# Patient Record
Sex: MALE | Race: WHITE | NOT HISPANIC OR LATINO | Employment: OTHER | URBAN - METROPOLITAN AREA
[De-identification: names, ages, dates, MRNs, and addresses within clinical notes are randomized per-mention and may not be internally consistent; named-entity substitution may affect disease eponyms.]

---

## 2023-08-17 PROBLEM — R04.0 EPISTAXIS: Status: ACTIVE | Noted: 2023-08-17

## 2023-08-17 PROBLEM — M19.90 OSTEOARTHRITIS: Status: ACTIVE | Noted: 2023-08-17

## 2023-08-17 PROBLEM — I10 HYPERTENSION: Status: ACTIVE | Noted: 2023-08-17

## 2023-08-17 PROBLEM — R07.9 INTERMITTENT CHEST PAIN: Status: ACTIVE | Noted: 2023-08-17

## 2023-08-17 PROBLEM — N40.0 BENIGN PROSTATIC HYPERPLASIA WITHOUT URINARY OBSTRUCTION: Status: ACTIVE | Noted: 2023-08-17

## 2023-08-17 PROBLEM — Z95.1 PRESENCE OF AORTOCORONARY BYPASS GRAFT: Status: ACTIVE | Noted: 2023-08-17

## 2023-08-17 PROBLEM — K40.90 INGUINAL HERNIA, LEFT: Status: ACTIVE | Noted: 2023-08-17

## 2023-08-17 PROBLEM — I25.10 ATHEROSCLEROTIC HEART DISEASE OF NATIVE CORONARY ARTERY WITHOUT ANGINA PECTORIS: Status: ACTIVE | Noted: 2023-08-17

## 2023-08-17 PROBLEM — E78.00 HYPERCHOLESTEROLEMIA: Status: ACTIVE | Noted: 2023-08-17

## 2023-08-17 PROBLEM — H91.90 HEARING LOSS: Status: ACTIVE | Noted: 2023-08-17

## 2023-08-17 PROBLEM — M25.511 RIGHT SHOULDER PAIN: Status: ACTIVE | Noted: 2023-08-17

## 2023-08-17 PROBLEM — S49.91XA RIGHT SHOULDER INJURY: Status: ACTIVE | Noted: 2023-08-17

## 2023-08-17 PROBLEM — Z98.62 HISTORY OF ANGIOPLASTY: Status: ACTIVE | Noted: 2023-08-17

## 2023-08-17 PROBLEM — M54.12 CERVICAL RADICULOPATHY: Status: ACTIVE | Noted: 2023-08-17

## 2023-08-17 PROBLEM — J34.2 DEVIATED NASAL SEPTUM: Status: ACTIVE | Noted: 2023-08-17

## 2023-08-17 RX ORDER — FOLIC ACID 0.4 MG
400 TABLET ORAL DAILY
COMMUNITY

## 2023-08-17 RX ORDER — MIRABEGRON 25 MG/1
TABLET, FILM COATED, EXTENDED RELEASE ORAL
COMMUNITY
Start: 2019-12-01 | End: 2024-04-12 | Stop reason: ALTCHOICE

## 2023-08-17 RX ORDER — ASPIRIN 81 MG/1
81 TABLET ORAL DAILY
COMMUNITY

## 2023-08-17 RX ORDER — NITROGLYCERIN 0.4 MG/1
0.4 TABLET SUBLINGUAL AS NEEDED
COMMUNITY

## 2023-08-17 RX ORDER — NAPROXEN SODIUM 220 MG/1
81 TABLET, FILM COATED ORAL DAILY
COMMUNITY
End: 2024-04-12 | Stop reason: ALTCHOICE

## 2023-08-17 RX ORDER — ROSUVASTATIN CALCIUM 40 MG/1
40 TABLET, COATED ORAL DAILY
COMMUNITY
Start: 2012-09-05

## 2023-08-17 RX ORDER — LYSINE HCL 500 MG
TABLET ORAL
COMMUNITY
End: 2024-04-12 | Stop reason: ALTCHOICE

## 2023-08-17 RX ORDER — FOLIC ACID 20 MG
CAPSULE ORAL
COMMUNITY
End: 2024-04-12 | Stop reason: ALTCHOICE

## 2023-08-17 RX ORDER — METOPROLOL SUCCINATE 25 MG/1
0.5 TABLET, EXTENDED RELEASE ORAL 2 TIMES DAILY
COMMUNITY
Start: 2012-02-22 | End: 2024-01-31 | Stop reason: SDUPTHER

## 2023-08-17 RX ORDER — CIPROFLOXACIN 500 MG/1
TABLET ORAL
COMMUNITY
Start: 2022-10-24 | End: 2024-04-12 | Stop reason: ALTCHOICE

## 2023-08-17 RX ORDER — MECLIZINE HYDROCHLORIDE 25 MG/1
75 TABLET ORAL DAILY PRN
COMMUNITY

## 2023-08-17 RX ORDER — AMLODIPINE BESYLATE 5 MG/1
5 TABLET ORAL DAILY
COMMUNITY
Start: 2023-04-19 | End: 2023-11-01 | Stop reason: SINTOL

## 2023-08-17 RX ORDER — QUINAPRIL 5 1/1
0.5 TABLET ORAL DAILY
COMMUNITY
Start: 2012-02-22 | End: 2024-04-12 | Stop reason: ALTCHOICE

## 2023-08-17 RX ORDER — TAMSULOSIN HYDROCHLORIDE 0.4 MG/1
0.4 CAPSULE ORAL DAILY
COMMUNITY
Start: 2012-12-21 | End: 2024-04-12 | Stop reason: ALTCHOICE

## 2023-11-01 ENCOUNTER — OFFICE VISIT (OUTPATIENT)
Dept: CARDIOLOGY | Facility: CLINIC | Age: 73
End: 2023-11-01
Payer: MEDICARE

## 2023-11-01 VITALS
WEIGHT: 140 LBS | OXYGEN SATURATION: 98 % | DIASTOLIC BLOOD PRESSURE: 70 MMHG | HEART RATE: 60 BPM | BODY MASS INDEX: 24.03 KG/M2 | SYSTOLIC BLOOD PRESSURE: 130 MMHG

## 2023-11-01 DIAGNOSIS — I25.10 ATHEROSCLEROSIS OF NATIVE CORONARY ARTERY OF NATIVE HEART WITHOUT ANGINA PECTORIS: ICD-10-CM

## 2023-11-01 DIAGNOSIS — I10 PRIMARY HYPERTENSION: Primary | ICD-10-CM

## 2023-11-01 PROCEDURE — 3075F SYST BP GE 130 - 139MM HG: CPT | Performed by: INTERNAL MEDICINE

## 2023-11-01 PROCEDURE — 1159F MED LIST DOCD IN RCRD: CPT | Performed by: INTERNAL MEDICINE

## 2023-11-01 PROCEDURE — 99213 OFFICE O/P EST LOW 20 MIN: CPT | Performed by: INTERNAL MEDICINE

## 2023-11-01 PROCEDURE — 3078F DIAST BP <80 MM HG: CPT | Performed by: INTERNAL MEDICINE

## 2023-11-01 PROCEDURE — 1126F AMNT PAIN NOTED NONE PRSNT: CPT | Performed by: INTERNAL MEDICINE

## 2023-11-01 RX ORDER — LOSARTAN POTASSIUM 50 MG/1
50 TABLET ORAL ONCE
Status: DISCONTINUED | OUTPATIENT
Start: 2023-11-01 | End: 2024-04-12

## 2023-11-01 ASSESSMENT — ENCOUNTER SYMPTOMS
OCCASIONAL FEELINGS OF UNSTEADINESS: 0
LOSS OF SENSATION IN FEET: 0
GASTROINTESTINAL NEGATIVE: 1
DEPRESSION: 0
EYES NEGATIVE: 1
ENDOCRINE NEGATIVE: 1
CONSTITUTIONAL NEGATIVE: 1
MUSCULOSKELETAL NEGATIVE: 1
RESPIRATORY NEGATIVE: 1
NEUROLOGICAL NEGATIVE: 1

## 2023-11-01 ASSESSMENT — PAIN SCALES - GENERAL: PAINLEVEL: 0-NO PAIN

## 2023-11-01 NOTE — PROGRESS NOTES
Subjective      Chief Complaint   Patient presents with    Follow-up          He is open to surgery in 2011  History of hypertension and hyperlipidemia.    IS doing well  He had covid last month and is better.  He is not complaining of chest discomfort.  NO PND or orthopnea.  The legs are not swelling on him.  He does not complain of palpitations.  He had the chol checked in March and is low.         Review of Systems   Constitutional: Negative.   HENT: Negative.     Eyes: Negative.    Respiratory: Negative.     Endocrine: Negative.    Skin: Negative.    Musculoskeletal: Negative.    Gastrointestinal: Negative.    Genitourinary: Negative.    Neurological: Negative.    All other systems reviewed and are negative.       Past Surgical History:   Procedure Laterality Date    COLONOSCOPY  11/21/2012    Complete Colonoscopy    CORONARY ARTERY BYPASS GRAFT  11/21/2012    CABG    OTHER SURGICAL HISTORY  04/13/2021    Hernia repair    TONSILLECTOMY  11/21/2012    Tonsillectomy With Adenoidectomy        Active Ambulatory Problems     Diagnosis Date Noted    Atherosclerotic heart disease of native coronary artery without angina pectoris 08/17/2023    Benign prostatic hyperplasia without urinary obstruction 08/17/2023    Cervical radiculopathy 08/17/2023    Deviated nasal septum 08/17/2023    Epistaxis 08/17/2023    Hearing loss 08/17/2023    History of angioplasty 08/17/2023    Hypercholesterolemia 08/17/2023    Hypertension 08/17/2023    Inguinal hernia, left 08/17/2023    Intermittent chest pain 08/17/2023    Osteoarthritis 08/17/2023    Presence of aortocoronary bypass graft 08/17/2023    Right shoulder injury 08/17/2023    Right shoulder pain 08/17/2023     Resolved Ambulatory Problems     Diagnosis Date Noted    No Resolved Ambulatory Problems     Past Medical History:   Diagnosis Date    Atherosclerotic heart disease of native coronary artery with unstable angina pectoris (CMS/[facility])     Chronic rhinitis     Encounter for  "immunization 12/23/2013    Encounter for immunization 12/23/2013    Personal history of diseases of the skin and subcutaneous tissue     Personal history of other diseases of male genital organs     Personal history of other diseases of the circulatory system     Personal history of other diseases of the circulatory system     Personal history of other diseases of the digestive system     Personal history of other diseases of the respiratory system     Unspecified atherosclerosis         Visit Vitals  /70   Pulse 60   Wt 63.5 kg (140 lb)   SpO2 98%   BMI 24.03 kg/m²   Smoking Status Never   BSA 1.69 m²        Objective     Constitutional:       Appearance: Healthy appearance.   Eyes:      Pupils: Pupils are equal, round, and reactive to light.   Neck:      Vascular: JVD normal.   Pulmonary:      Breath sounds: Normal breath sounds.   Cardiovascular:      PMI at left midclavicular line. Normal rate. Regular rhythm. Normal S1. Normal S2.       Murmurs: There is no murmur.      No gallop.  No click. No rub.   Pulses:     Intact distal pulses.   Edema:     Peripheral edema absent.   Abdominal:      Palpations: Abdomen is soft.      Tenderness: There is no abdominal tenderness.   Musculoskeletal:      Extremities: No clubbing present.Skin:     General: Skin is warm and dry.   Neurological:      General: No focal deficit present.            Lab Review:         Lab Results   Component Value Date    CHOL 159 03/16/2023    CHOL 147 03/03/2022    CHOL 153 01/27/2021     Lab Results   Component Value Date    HDL 51 03/16/2023    HDL 51 03/03/2022    HDL 49 01/27/2021     Lab Results   Component Value Date    LDLCALC 92 03/16/2023    LDLCALC 84 03/03/2022    LDLCALC 90 01/27/2021     Lab Results   Component Value Date    TRIG 80 03/16/2023    TRIG 62 03/03/2022    TRIG 68 01/27/2021     No components found for: \"CHOLHDL\"     Assessment/Plan     Hypertension  Is dong well and is having ED problems since on the norvasc and " will stop and start on losartan    Atherosclerotic heart disease of native coronary artery without angina pectoris  Is doing well and no angina and no chf.

## 2023-11-06 DIAGNOSIS — I10 PRIMARY HYPERTENSION: ICD-10-CM

## 2023-11-06 DIAGNOSIS — I10 PRIMARY HYPERTENSION: Primary | ICD-10-CM

## 2023-11-06 RX ORDER — LOSARTAN POTASSIUM 50 MG/1
50 TABLET ORAL ONCE
Status: CANCELLED | OUTPATIENT
Start: 2023-11-06 | End: 2023-11-06

## 2023-11-06 RX ORDER — LOSARTAN POTASSIUM 50 MG/1
50 TABLET ORAL DAILY
Qty: 90 TABLET | Refills: 3 | Status: SHIPPED | OUTPATIENT
Start: 2023-11-06 | End: 2024-04-12 | Stop reason: WASHOUT

## 2023-11-10 RX ORDER — LOSARTAN POTASSIUM 50 MG/1
50 TABLET ORAL DAILY
Qty: 30 TABLET | Refills: 11 | Status: SHIPPED | OUTPATIENT
Start: 2023-11-10 | End: 2024-01-30 | Stop reason: SDUPTHER

## 2024-01-30 DIAGNOSIS — I10 PRIMARY HYPERTENSION: ICD-10-CM

## 2024-01-30 RX ORDER — LOSARTAN POTASSIUM 50 MG/1
50 TABLET ORAL DAILY
Qty: 90 TABLET | Refills: 3 | Status: SHIPPED | OUTPATIENT
Start: 2024-01-30 | End: 2025-01-29

## 2024-01-31 DIAGNOSIS — I10 PRIMARY HYPERTENSION: Primary | ICD-10-CM

## 2024-01-31 RX ORDER — METOPROLOL SUCCINATE 25 MG/1
12.5 TABLET, EXTENDED RELEASE ORAL 2 TIMES DAILY
Qty: 45 TABLET | Refills: 3 | Status: SHIPPED | OUTPATIENT
Start: 2024-01-31

## 2024-04-10 ASSESSMENT — ENCOUNTER SYMPTOMS
SHORTNESS OF BREATH: 0
ABDOMINAL PAIN: 0
FEVER: 0

## 2024-04-10 NOTE — PROGRESS NOTES
[facility]: [facility]  PROGRESS NOTE      Anatoly Rice is a 73 y.o. male that is presenting today for No chief complaint on file..    Assessment/Plan   Diagnoses and all orders for this visit:  Atherosclerosis of native coronary artery of native heart without angina pectoris  Benign prostatic hyperplasia without urinary obstruction  Hypercholesterolemia  Primary hypertension  Presence of aortocoronary bypass graft  Cervical radiculopathy  History of angioplasty  Encounter for routine laboratory testing  Vitamin D deficiency  Encounter for prostate cancer screening    Will refer to a new urologist, he is due for labs now we will check the PSA, will provide a prescription for sildenafil and I explained to him how to use it.  Blood pressure is controlled on metoprolol and the losartan.  Lipids stable on the rosuvastatin, check labs.  Free of anginal symptoms, continue aspirin, statin, beta-blocker, he has nitroglycerin but has never had to use it.    Subjective   HPI  73 y.o. male here for follow up.    Generally doing well.  His urologist is retiring and he needs to go to a neurologist.  The main issue from a  standpoint at this time is that he is having trouble with erectile dysfunction.  Ever since he had to change his blood pressure medications he is having more issues that way.  He never tried PDE inhibitors and interested in it.  He never takes the nitroglycerin.  No chest pain, no shortness of breath.  He does follow with his cardiologist as well.  Blood pressure has been controlled.  He has been hiking twice a week as in the past without any exertion provoked or  anginal-type symptoms.    Review of Systems   Constitutional:  Negative for fever.   Respiratory:  Negative for shortness of breath.    Cardiovascular:  Negative for chest pain.   Gastrointestinal:  Negative for abdominal pain.   All other systems reviewed and are negative.     Objective   There were no vitals filed for this  "visit.   There is no height or weight on file to calculate BMI.  Physical Exam  Vitals reviewed.   Constitutional:       Appearance: Normal appearance.   Cardiovascular:      Rate and Rhythm: Normal rate and regular rhythm.      Heart sounds: No murmur heard.  Pulmonary:      Breath sounds: Normal breath sounds. No wheezing, rhonchi or rales.   Musculoskeletal:      Right lower leg: No edema.      Left lower leg: No edema.       Diagnostic Results   Lab Results   Component Value Date    GLUCOSE 103 (H) 03/16/2023    CALCIUM 9.4 03/16/2023     (H) 03/16/2023    K 4.8 03/16/2023    CO2 25 03/16/2023     03/16/2023    BUN 19 03/16/2023    CREATININE 1.2 03/16/2023     Lab Results   Component Value Date    ALT 17 03/16/2023    AST 21 03/16/2023    ALKPHOS 72 03/16/2023    BILITOT 0.5 03/16/2023     Lab Results   Component Value Date    WBC 6.2 03/16/2023    HGB 16.9 (H) 03/16/2023    HCT 51.1 (H) 03/16/2023    MCV 84.9 03/16/2023     03/16/2023     Lab Results   Component Value Date    CHOL 159 03/16/2023    CHOL 147 03/03/2022    CHOL 153 01/27/2021     Lab Results   Component Value Date    HDL 51 03/16/2023    HDL 51 03/03/2022    HDL 49 01/27/2021     Lab Results   Component Value Date    LDLCALC 92 03/16/2023    LDLCALC 84 03/03/2022    LDLCALC 90 01/27/2021     Lab Results   Component Value Date    TRIG 80 03/16/2023    TRIG 62 03/03/2022    TRIG 68 01/27/2021     No components found for: \"CHOLHDL\"  No results found for: \"HGBA1C\"  Other labs not included in the list above were reviewed either before or during this encounter.    History    Past Medical History:   Diagnosis Date    Atherosclerotic heart disease of native coronary artery with unstable angina pectoris (CMS/HCC)     Coronary artery disease with unstable angina pectoris, unspecified vessel or lesion type, unspecified whether native or transplanted heart    Chronic rhinitis     Rhinitis    Encounter for immunization 12/23/2013    Need " for prophylactic vaccination and inoculation against influenza    Encounter for immunization 12/23/2013    Need for pneumococcal vaccination    Personal history of diseases of the skin and subcutaneous tissue     History of atopic dermatitis    Personal history of other diseases of male genital organs     History of benign prostatic hypertrophy    Personal history of other diseases of the circulatory system     History of hypertension    Personal history of other diseases of the circulatory system     History of angina pectoris    Personal history of other diseases of the digestive system     History of hemorrhoids    Personal history of other diseases of the respiratory system     History of upper respiratory infection    Unspecified atherosclerosis     Atherosclerosis     Past Surgical History:   Procedure Laterality Date    COLONOSCOPY  11/21/2012    Complete Colonoscopy    CORONARY ARTERY BYPASS GRAFT  11/21/2012    CABG    OTHER SURGICAL HISTORY  04/13/2021    Hernia repair    TONSILLECTOMY  11/21/2012    Tonsillectomy With Adenoidectomy     Family History   Problem Relation Name Age of Onset    Breast cancer Mother      Heart disease Mother      Hypertension Mother      Stroke Mother      Cancer Mother       Social History     Socioeconomic History    Marital status:      Spouse name: Not on file    Number of children: Not on file    Years of education: Not on file    Highest education level: Not on file   Occupational History    Not on file   Tobacco Use    Smoking status: Never    Smokeless tobacco: Not on file   Substance and Sexual Activity    Alcohol use: Not on file    Drug use: Not on file    Sexual activity: Not on file   Other Topics Concern    Not on file   Social History Narrative    Not on file     Social Determinants of Health     Financial Resource Strain: Not on file   Food Insecurity: Not on file   Transportation Needs: Not on file   Physical Activity: Not on file   Stress: Not on file    Social Connections: Not on file   Intimate Partner Violence: Not on file   Housing Stability: Not on file     Allergies   Allergen Reactions    Cat Dander Agitation    Lisinopril Cough    Adhesive Tape-Silicones Rash     Current Outpatient Medications on File Prior to Visit   Medication Sig Dispense Refill    aspirin 81 mg chewable tablet Chew 1 tablet (81 mg) once daily.      aspirin 81 mg EC tablet Take 1 tablet (81 mg) by mouth once daily.      calcium carbonate-vit D3-min 600 mg calcium- 400 unit tablet TAKE AS DIRECTED PER PACKAGE INSTRUCTIONS.      ciprofloxacin (Cipro) 500 mg tablet       folic acid (Folvite) 400 mcg tablet Take 1 tablet (400 mcg) by mouth once daily.      folic acid 20 mg capsule Folic Acid CAPS   Refills: 0       Active      glucosamine HCl/chondroitin vásquez (GLUCOSAMINE-CHONDROITIN ORAL) Take 500 mg by mouth once daily.      levocarnitine HCl (ACETYL-L-CARNITINE MISC) Take 400 mg by mouth once daily.      losartan (Cozaar) 50 mg tablet Take 1 tablet (50 mg) by mouth once daily. 90 tablet 3    losartan (Cozaar) 50 mg tablet Take 1 tablet (50 mg) by mouth once daily. 90 tablet 3    meclizine (Antivert) 25 mg tablet Take 3 tablets (75 mg) by mouth once daily as needed.      metoprolol succinate XL (Toprol-XL) 25 mg 24 hr tablet Take 0.5 tablets (12.5 mg) by mouth 2 times a day. 45 tablet 3    mirabegron (Myrbetriq) 25 mg tablet extended release 24 hr 24 hr tablet Myrbetriq 25 MG Oral Tablet Extended Release 24 Hour   Quantity: 90  Refills: 0        Start : 1-Dec-2019   Active      nitroglycerin (Nitrostat) 0.4 mg SL tablet Place 1 tablet (0.4 mg) under the tongue if needed.      quinapril (Accupril) 5 mg tablet Take 0.5 tablets (2.5 mg) by mouth once daily.      rosuvastatin (Crestor) 40 mg tablet Take 1 tablet (40 mg) by mouth once daily.      tamsulosin (Flomax) 0.4 mg 24 hr capsule Take 1 capsule (0.4 mg) by mouth once daily. After same meal each day       Current Facility-Administered  Medications on File Prior to Visit   Medication Dose Route Frequency Provider Last Rate Last Admin    losartan (Cozaar) tablet 50 mg  50 mg oral Once Clark Rodrigues MD         Immunization History   Administered Date(s) Administered    Flu vaccine, quadrivalent, high-dose, preservative free, age 65y+ (FLUZONE) 09/01/2020, 10/07/2022    Influenza, High Dose Seasonal, Preservative Free 11/13/2018    Influenza, Seasonal, Quadrivalent, Adjuvanted 09/28/2020, 10/20/2021    Influenza, Unspecified 12/23/2011    Influenza, injectable, quadrivalent 12/29/2017, 11/04/2019    Influenza, seasonal, injectable 12/21/2012, 12/23/2013    Pfizer COVID-19 vaccine, bivalent, age 12 years and older (30 mcg/0.3 mL) 10/07/2022    Pfizer Gray Cap SARS-CoV-2 07/22/2022    Pfizer Purple Cap SARS-CoV-2 03/01/2021, 03/22/2021, 10/20/2021    Pneumococcal conjugate vaccine, 13-valent (PREVNAR 13) 12/29/2017    Pneumococcal polysaccharide vaccine, 23-valent, age 2 years and older (PNEUMOVAX 23) 12/23/2013    Tdap vaccine, age 7 year and older (BOOSTRIX, ADACEL) 04/18/2014, 09/21/2021     Patient's medical history was reviewed and updated either before or during this encounter.       Miguel A Salazar MD

## 2024-04-12 ENCOUNTER — OFFICE VISIT (OUTPATIENT)
Dept: PRIMARY CARE | Facility: CLINIC | Age: 74
End: 2024-04-12
Payer: MEDICARE

## 2024-04-12 VITALS
BODY MASS INDEX: 25.44 KG/M2 | OXYGEN SATURATION: 96 % | SYSTOLIC BLOOD PRESSURE: 124 MMHG | WEIGHT: 149 LBS | HEART RATE: 56 BPM | TEMPERATURE: 98.6 F | DIASTOLIC BLOOD PRESSURE: 70 MMHG | HEIGHT: 64 IN

## 2024-04-12 DIAGNOSIS — Z98.62 HISTORY OF ANGIOPLASTY: ICD-10-CM

## 2024-04-12 DIAGNOSIS — N52.9 ERECTILE DYSFUNCTION, UNSPECIFIED ERECTILE DYSFUNCTION TYPE: ICD-10-CM

## 2024-04-12 DIAGNOSIS — Z95.1 PRESENCE OF AORTOCORONARY BYPASS GRAFT: ICD-10-CM

## 2024-04-12 DIAGNOSIS — I25.10 ATHEROSCLEROSIS OF NATIVE CORONARY ARTERY OF NATIVE HEART WITHOUT ANGINA PECTORIS: Primary | ICD-10-CM

## 2024-04-12 DIAGNOSIS — I10 PRIMARY HYPERTENSION: ICD-10-CM

## 2024-04-12 DIAGNOSIS — Z12.5 ENCOUNTER FOR PROSTATE CANCER SCREENING: ICD-10-CM

## 2024-04-12 DIAGNOSIS — E55.9 VITAMIN D DEFICIENCY: ICD-10-CM

## 2024-04-12 DIAGNOSIS — R73.9 HYPERGLYCEMIA: ICD-10-CM

## 2024-04-12 DIAGNOSIS — Z01.89 ENCOUNTER FOR ROUTINE LABORATORY TESTING: ICD-10-CM

## 2024-04-12 DIAGNOSIS — M54.12 CERVICAL RADICULOPATHY: ICD-10-CM

## 2024-04-12 DIAGNOSIS — N40.0 BENIGN PROSTATIC HYPERPLASIA WITHOUT URINARY OBSTRUCTION: ICD-10-CM

## 2024-04-12 DIAGNOSIS — E78.00 HYPERCHOLESTEROLEMIA: ICD-10-CM

## 2024-04-12 PROCEDURE — 1036F TOBACCO NON-USER: CPT | Performed by: INTERNAL MEDICINE

## 2024-04-12 PROCEDURE — 99214 OFFICE O/P EST MOD 30 MIN: CPT | Performed by: INTERNAL MEDICINE

## 2024-04-12 PROCEDURE — 3078F DIAST BP <80 MM HG: CPT | Performed by: INTERNAL MEDICINE

## 2024-04-12 PROCEDURE — 1157F ADVNC CARE PLAN IN RCRD: CPT | Performed by: INTERNAL MEDICINE

## 2024-04-12 PROCEDURE — 3074F SYST BP LT 130 MM HG: CPT | Performed by: INTERNAL MEDICINE

## 2024-04-12 PROCEDURE — 1159F MED LIST DOCD IN RCRD: CPT | Performed by: INTERNAL MEDICINE

## 2024-04-12 PROCEDURE — 1126F AMNT PAIN NOTED NONE PRSNT: CPT | Performed by: INTERNAL MEDICINE

## 2024-04-12 RX ORDER — SILDENAFIL 50 MG/1
50 TABLET, FILM COATED ORAL DAILY PRN
Qty: 30 TABLET | Refills: 2 | Status: SHIPPED | OUTPATIENT
Start: 2024-04-12 | End: 2024-07-11

## 2024-04-12 ASSESSMENT — LIFESTYLE VARIABLES
HOW OFTEN DURING THE LAST YEAR HAVE YOU FAILED TO DO WHAT WAS NORMALLY EXPECTED FROM YOU BECAUSE OF DRINKING: NEVER
AUDIT-C TOTAL SCORE: 0
HOW OFTEN DURING THE LAST YEAR HAVE YOU BEEN UNABLE TO REMEMBER WHAT HAPPENED THE NIGHT BEFORE BECAUSE YOU HAD BEEN DRINKING: NEVER
HOW OFTEN DURING THE LAST YEAR HAVE YOU NEEDED AN ALCOHOLIC DRINK FIRST THING IN THE MORNING TO GET YOURSELF GOING AFTER A NIGHT OF HEAVY DRINKING: NEVER
HAVE YOU OR SOMEONE ELSE BEEN INJURED AS A RESULT OF YOUR DRINKING: NO
HOW OFTEN DURING THE LAST YEAR HAVE YOU HAD A FEELING OF GUILT OR REMORSE AFTER DRINKING: NEVER
AUDIT TOTAL SCORE: 0
HOW MANY STANDARD DRINKS CONTAINING ALCOHOL DO YOU HAVE ON A TYPICAL DAY: PATIENT DOES NOT DRINK
HOW OFTEN DO YOU HAVE A DRINK CONTAINING ALCOHOL: NEVER
SKIP TO QUESTIONS 9-10: 1
HOW OFTEN DURING THE LAST YEAR HAVE YOU FOUND THAT YOU WERE NOT ABLE TO STOP DRINKING ONCE YOU HAD STARTED: NEVER
HOW OFTEN DO YOU HAVE SIX OR MORE DRINKS ON ONE OCCASION: NEVER
HAS A RELATIVE, FRIEND, DOCTOR, OR ANOTHER HEALTH PROFESSIONAL EXPRESSED CONCERN ABOUT YOUR DRINKING OR SUGGESTED YOU CUT DOWN: NO

## 2024-04-12 ASSESSMENT — PATIENT HEALTH QUESTIONNAIRE - PHQ9
1. LITTLE INTEREST OR PLEASURE IN DOING THINGS: NOT AT ALL
SUM OF ALL RESPONSES TO PHQ9 QUESTIONS 1 AND 2: 0
2. FEELING DOWN, DEPRESSED OR HOPELESS: NOT AT ALL

## 2024-04-12 ASSESSMENT — ENCOUNTER SYMPTOMS
LOSS OF SENSATION IN FEET: 0
OCCASIONAL FEELINGS OF UNSTEADINESS: 0
DEPRESSION: 0

## 2024-04-12 ASSESSMENT — PAIN SCALES - GENERAL: PAINLEVEL: 0-NO PAIN

## 2024-04-15 ENCOUNTER — LAB (OUTPATIENT)
Dept: LAB | Facility: LAB | Age: 74
End: 2024-04-15
Payer: MEDICARE

## 2024-04-15 DIAGNOSIS — R73.9 HYPERGLYCEMIA: ICD-10-CM

## 2024-04-15 DIAGNOSIS — E55.9 VITAMIN D DEFICIENCY: ICD-10-CM

## 2024-04-15 DIAGNOSIS — Z01.89 ENCOUNTER FOR ROUTINE LABORATORY TESTING: ICD-10-CM

## 2024-04-15 DIAGNOSIS — Z12.5 ENCOUNTER FOR PROSTATE CANCER SCREENING: ICD-10-CM

## 2024-04-15 DIAGNOSIS — I10 PRIMARY HYPERTENSION: ICD-10-CM

## 2024-04-15 LAB
ALBUMIN SERPL BCP-MCNC: 4.4 G/DL (ref 3.4–5)
ALP SERPL-CCNC: 57 U/L (ref 33–136)
ALT SERPL W P-5'-P-CCNC: 17 U/L (ref 10–52)
ANION GAP SERPL CALC-SCNC: 15 MMOL/L (ref 10–20)
AST SERPL W P-5'-P-CCNC: 20 U/L (ref 9–39)
BASOPHILS # BLD AUTO: 0.04 X10*3/UL (ref 0–0.1)
BASOPHILS NFR BLD AUTO: 0.6 %
BILIRUB SERPL-MCNC: 1.1 MG/DL (ref 0–1.2)
BUN SERPL-MCNC: 20 MG/DL (ref 6–23)
CALCIUM SERPL-MCNC: 9 MG/DL (ref 8.6–10.3)
CHLORIDE SERPL-SCNC: 104 MMOL/L (ref 98–107)
CHOLEST SERPL-MCNC: 141 MG/DL (ref 0–199)
CHOLESTEROL/HDL RATIO: 3
CO2 SERPL-SCNC: 28 MMOL/L (ref 21–32)
CREAT SERPL-MCNC: 1.18 MG/DL (ref 0.5–1.3)
EGFRCR SERPLBLD CKD-EPI 2021: 64 ML/MIN/1.73M*2
EOSINOPHIL # BLD AUTO: 0.24 X10*3/UL (ref 0–0.4)
EOSINOPHIL NFR BLD AUTO: 3.7 %
ERYTHROCYTE [DISTWIDTH] IN BLOOD BY AUTOMATED COUNT: 13.3 % (ref 11.5–14.5)
GLUCOSE SERPL-MCNC: 93 MG/DL (ref 74–99)
HCT VFR BLD AUTO: 48.6 % (ref 41–52)
HDLC SERPL-MCNC: 46.4 MG/DL
HGB BLD-MCNC: 16.2 G/DL (ref 13.5–17.5)
IMM GRANULOCYTES # BLD AUTO: 0.01 X10*3/UL (ref 0–0.5)
IMM GRANULOCYTES NFR BLD AUTO: 0.2 % (ref 0–0.9)
LDLC SERPL CALC-MCNC: 79 MG/DL
LYMPHOCYTES # BLD AUTO: 2.45 X10*3/UL (ref 0.8–3)
LYMPHOCYTES NFR BLD AUTO: 38.2 %
MCH RBC QN AUTO: 28.9 PG (ref 26–34)
MCHC RBC AUTO-ENTMCNC: 33.3 G/DL (ref 32–36)
MCV RBC AUTO: 87 FL (ref 80–100)
MONOCYTES # BLD AUTO: 0.67 X10*3/UL (ref 0.05–0.8)
MONOCYTES NFR BLD AUTO: 10.4 %
NEUTROPHILS # BLD AUTO: 3.01 X10*3/UL (ref 1.6–5.5)
NEUTROPHILS NFR BLD AUTO: 46.9 %
NON HDL CHOLESTEROL: 95 MG/DL (ref 0–149)
NRBC BLD-RTO: 0 /100 WBCS (ref 0–0)
PLATELET # BLD AUTO: 183 X10*3/UL (ref 150–450)
POTASSIUM SERPL-SCNC: 4.3 MMOL/L (ref 3.5–5.3)
PROT SERPL-MCNC: 6.6 G/DL (ref 6.4–8.2)
RBC # BLD AUTO: 5.6 X10*6/UL (ref 4.5–5.9)
SODIUM SERPL-SCNC: 143 MMOL/L (ref 136–145)
TRIGL SERPL-MCNC: 76 MG/DL (ref 0–149)
TSH SERPL-ACNC: 0.45 MIU/L (ref 0.44–3.98)
VLDL: 15 MG/DL (ref 0–40)
WBC # BLD AUTO: 6.4 X10*3/UL (ref 4.4–11.3)

## 2024-04-15 PROCEDURE — 82306 VITAMIN D 25 HYDROXY: CPT

## 2024-04-15 PROCEDURE — 85025 COMPLETE CBC W/AUTO DIFF WBC: CPT

## 2024-04-15 PROCEDURE — 84443 ASSAY THYROID STIM HORMONE: CPT

## 2024-04-15 PROCEDURE — 80053 COMPREHEN METABOLIC PANEL: CPT

## 2024-04-15 PROCEDURE — G0103 PSA SCREENING: HCPCS

## 2024-04-15 PROCEDURE — 36415 COLL VENOUS BLD VENIPUNCTURE: CPT

## 2024-04-15 PROCEDURE — 80061 LIPID PANEL: CPT

## 2024-04-15 PROCEDURE — 83036 HEMOGLOBIN GLYCOSYLATED A1C: CPT

## 2024-04-16 LAB
25(OH)D3 SERPL-MCNC: 38 NG/ML (ref 30–100)
EST. AVERAGE GLUCOSE BLD GHB EST-MCNC: 114 MG/DL
HBA1C MFR BLD: 5.6 %
PSA SERPL-MCNC: 1.46 NG/ML

## 2024-05-14 NOTE — PROGRESS NOTES
Subjective      Chief Complaint   Patient presents with    Follow-up          History of having open heart surgery in 2011 he has hypertension and hyperlipidemia.  Is active with hiking.  He is not complaining of chest discomfort.  NO PND or orthopnea.  The legs are not swelling on him.  He does not complain of palpitations.  The BP is doing well  The chol is controlled           Review of Systems   Constitutional: Negative. Negative for chills and fever.   HENT: Negative.     Eyes: Negative.    Respiratory: Negative.  Negative for cough.    Endocrine: Negative.    Skin: Negative.    Musculoskeletal:  Positive for back pain and joint pain.   Gastrointestinal: Negative.    Genitourinary: Negative.    Neurological: Negative.    All other systems reviewed and are negative.       Past Surgical History:   Procedure Laterality Date    COLONOSCOPY  11/21/2012    Complete Colonoscopy    CORONARY ARTERY BYPASS GRAFT  11/21/2012    CABG    OTHER SURGICAL HISTORY  04/13/2021    Hernia repair    TONSILLECTOMY  11/21/2012    Tonsillectomy With Adenoidectomy        Active Ambulatory Problems     Diagnosis Date Noted    Atherosclerotic heart disease of native coronary artery without angina pectoris 08/17/2023    Benign prostatic hyperplasia without urinary obstruction 08/17/2023    Cervical radiculopathy 08/17/2023    Deviated nasal septum 08/17/2023    Epistaxis 08/17/2023    Hearing loss 08/17/2023    History of angioplasty 08/17/2023    Hypercholesterolemia 08/17/2023    Hypertension 08/17/2023    Inguinal hernia, left 08/17/2023    Intermittent chest pain 08/17/2023    Osteoarthritis 08/17/2023    Presence of aortocoronary bypass graft 08/17/2023    Right shoulder injury 08/17/2023    Right shoulder pain 08/17/2023     Resolved Ambulatory Problems     Diagnosis Date Noted    No Resolved Ambulatory Problems     Past Medical History:   Diagnosis Date    Atherosclerotic heart disease of native coronary artery with unstable angina  pectoris (Multi)     Chronic rhinitis     Encounter for immunization 12/23/2013    Encounter for immunization 12/23/2013    Personal history of diseases of the skin and subcutaneous tissue     Personal history of other diseases of male genital organs     Personal history of other diseases of the circulatory system     Personal history of other diseases of the circulatory system     Personal history of other diseases of the digestive system     Personal history of other diseases of the respiratory system     Unspecified atherosclerosis         Visit Vitals  /70   Pulse 55   Wt 65.3 kg (144 lb)   SpO2 96%   BMI 24.72 kg/m²   Smoking Status Never   BSA 1.72 m²        Objective     Constitutional:       Appearance: Healthy appearance.   Eyes:      Pupils: Pupils are equal, round, and reactive to light.   Neck:      Vascular: No JVR. JVD normal.   Pulmonary:      Effort: Pulmonary effort is normal.      Breath sounds: Normal breath sounds.   Cardiovascular:      PMI at left midclavicular line. Normal rate. Regular rhythm. Normal S1. Normal S2.       Murmurs: There is no murmur.      No gallop.  No click. No rub.   Pulses:     Intact distal pulses.   Edema:     Peripheral edema absent.   Abdominal:      Palpations: Abdomen is soft.      Tenderness: There is no abdominal tenderness.   Musculoskeletal: Normal range of motion.      Extremities: No clubbing present.Skin:     General: Skin is warm and dry.   Neurological:      General: No focal deficit present.            Lab Review:         Lab Results   Component Value Date    CHOL 141 04/15/2024    CHOL 159 03/16/2023    CHOL 147 03/03/2022     Lab Results   Component Value Date    HDL 46.4 04/15/2024    HDL 51 03/16/2023    HDL 51 03/03/2022     Lab Results   Component Value Date    LDLCALC 79 04/15/2024    LDLCALC 92 03/16/2023    LDLCALC 84 03/03/2022     Lab Results   Component Value Date    TRIG 76 04/15/2024    TRIG 80 03/16/2023    TRIG 62 03/03/2022     No  "components found for: \"CHOLHDL\"     Assessment/Plan     Atherosclerotic heart disease of native coronary artery without angina pectoris  Is doing well and is active.  No angina and no chf.  The OHS was 13 years ago    Hypercholesterolemia  Is controlled    Hypertension  Is controlled     "

## 2024-05-15 ENCOUNTER — OFFICE VISIT (OUTPATIENT)
Dept: CARDIOLOGY | Facility: CLINIC | Age: 74
End: 2024-05-15
Payer: MEDICARE

## 2024-05-15 VITALS
BODY MASS INDEX: 24.72 KG/M2 | HEART RATE: 55 BPM | OXYGEN SATURATION: 96 % | SYSTOLIC BLOOD PRESSURE: 128 MMHG | WEIGHT: 144 LBS | DIASTOLIC BLOOD PRESSURE: 70 MMHG

## 2024-05-15 DIAGNOSIS — I25.10 ATHEROSCLEROSIS OF NATIVE CORONARY ARTERY OF NATIVE HEART WITHOUT ANGINA PECTORIS: Primary | ICD-10-CM

## 2024-05-15 DIAGNOSIS — I10 PRIMARY HYPERTENSION: ICD-10-CM

## 2024-05-15 DIAGNOSIS — E78.00 HYPERCHOLESTEROLEMIA: ICD-10-CM

## 2024-05-15 PROCEDURE — 99213 OFFICE O/P EST LOW 20 MIN: CPT | Performed by: INTERNAL MEDICINE

## 2024-05-15 PROCEDURE — 3074F SYST BP LT 130 MM HG: CPT | Performed by: INTERNAL MEDICINE

## 2024-05-15 PROCEDURE — 3078F DIAST BP <80 MM HG: CPT | Performed by: INTERNAL MEDICINE

## 2024-05-15 PROCEDURE — 1159F MED LIST DOCD IN RCRD: CPT | Performed by: INTERNAL MEDICINE

## 2024-05-15 PROCEDURE — 1157F ADVNC CARE PLAN IN RCRD: CPT | Performed by: INTERNAL MEDICINE

## 2024-05-15 PROCEDURE — 1160F RVW MEDS BY RX/DR IN RCRD: CPT | Performed by: INTERNAL MEDICINE

## 2024-05-15 PROCEDURE — 1036F TOBACCO NON-USER: CPT | Performed by: INTERNAL MEDICINE

## 2024-05-15 ASSESSMENT — ENCOUNTER SYMPTOMS
NEUROLOGICAL NEGATIVE: 1
LOSS OF SENSATION IN FEET: 0
ENDOCRINE NEGATIVE: 1
CHILLS: 0
COUGH: 0
DEPRESSION: 0
OCCASIONAL FEELINGS OF UNSTEADINESS: 0
RESPIRATORY NEGATIVE: 1
BACK PAIN: 1
CONSTITUTIONAL NEGATIVE: 1
GASTROINTESTINAL NEGATIVE: 1
FEVER: 0
EYES NEGATIVE: 1

## 2024-06-19 DIAGNOSIS — I10 PRIMARY HYPERTENSION: ICD-10-CM

## 2024-06-19 RX ORDER — METOPROLOL SUCCINATE 25 MG/1
12.5 TABLET, EXTENDED RELEASE ORAL 2 TIMES DAILY
Qty: 45 TABLET | Refills: 3 | Status: SHIPPED | OUTPATIENT
Start: 2024-06-19

## 2024-07-29 DIAGNOSIS — E78.00 HYPERCHOLESTEROLEMIA: Primary | ICD-10-CM

## 2024-07-29 RX ORDER — ROSUVASTATIN CALCIUM 40 MG/1
40 TABLET, COATED ORAL DAILY
Qty: 90 TABLET | Refills: 3 | Status: SHIPPED | OUTPATIENT
Start: 2024-07-29

## 2024-08-01 ENCOUNTER — APPOINTMENT (OUTPATIENT)
Dept: UROLOGY | Facility: CLINIC | Age: 74
End: 2024-08-01
Payer: MEDICARE

## 2024-08-01 DIAGNOSIS — N40.1 BENIGN PROSTATIC HYPERPLASIA WITH LOWER URINARY TRACT SYMPTOMS, SYMPTOM DETAILS UNSPECIFIED: Primary | ICD-10-CM

## 2024-08-01 DIAGNOSIS — N52.9 ERECTILE DYSFUNCTION, UNSPECIFIED ERECTILE DYSFUNCTION TYPE: ICD-10-CM

## 2024-08-01 PROCEDURE — 99204 OFFICE O/P NEW MOD 45 MIN: CPT | Performed by: STUDENT IN AN ORGANIZED HEALTH CARE EDUCATION/TRAINING PROGRAM

## 2024-08-01 PROCEDURE — G2211 COMPLEX E/M VISIT ADD ON: HCPCS | Performed by: STUDENT IN AN ORGANIZED HEALTH CARE EDUCATION/TRAINING PROGRAM

## 2024-08-01 PROCEDURE — 1157F ADVNC CARE PLAN IN RCRD: CPT | Performed by: STUDENT IN AN ORGANIZED HEALTH CARE EDUCATION/TRAINING PROGRAM

## 2024-08-02 RX ORDER — SILDENAFIL 50 MG/1
50 TABLET, FILM COATED ORAL DAILY PRN
Qty: 30 TABLET | Refills: 2 | Status: SHIPPED | OUTPATIENT
Start: 2024-08-02 | End: 2024-10-31

## 2024-08-05 ENCOUNTER — APPOINTMENT (OUTPATIENT)
Dept: RADIOLOGY | Facility: HOSPITAL | Age: 74
End: 2024-08-05
Payer: MEDICARE

## 2024-08-05 ENCOUNTER — HOSPITAL ENCOUNTER (OUTPATIENT)
Dept: RADIOLOGY | Facility: HOSPITAL | Age: 74
Discharge: HOME | End: 2024-08-05
Payer: MEDICARE

## 2024-08-05 DIAGNOSIS — N40.1 BENIGN PROSTATIC HYPERPLASIA WITH LOWER URINARY TRACT SYMPTOMS, SYMPTOM DETAILS UNSPECIFIED: ICD-10-CM

## 2024-08-05 PROCEDURE — 76770 US EXAM ABDO BACK WALL COMP: CPT | Performed by: RADIOLOGY

## 2024-08-05 PROCEDURE — 76770 US EXAM ABDO BACK WALL COMP: CPT

## 2024-08-06 ENCOUNTER — LAB (OUTPATIENT)
Dept: LAB | Facility: LAB | Age: 74
End: 2024-08-06
Payer: MEDICARE

## 2024-08-06 DIAGNOSIS — N40.1 BENIGN PROSTATIC HYPERPLASIA WITH LOWER URINARY TRACT SYMPTOMS, SYMPTOM DETAILS UNSPECIFIED: ICD-10-CM

## 2024-08-06 LAB
APPEARANCE UR: CLEAR
BILIRUB UR STRIP.AUTO-MCNC: NEGATIVE MG/DL
COLOR UR: NORMAL
GLUCOSE UR STRIP.AUTO-MCNC: NORMAL MG/DL
KETONES UR STRIP.AUTO-MCNC: NEGATIVE MG/DL
LEUKOCYTE ESTERASE UR QL STRIP.AUTO: NEGATIVE
NITRITE UR QL STRIP.AUTO: NEGATIVE
PH UR STRIP.AUTO: 5 [PH]
PROT UR STRIP.AUTO-MCNC: NEGATIVE MG/DL
RBC # UR STRIP.AUTO: NEGATIVE /UL
SP GR UR STRIP.AUTO: 1.02
UROBILINOGEN UR STRIP.AUTO-MCNC: NORMAL MG/DL

## 2024-08-06 PROCEDURE — 81003 URINALYSIS AUTO W/O SCOPE: CPT

## 2024-08-06 PROCEDURE — 87086 URINE CULTURE/COLONY COUNT: CPT

## 2024-08-08 LAB — BACTERIA UR CULT: NO GROWTH

## 2024-10-03 ENCOUNTER — APPOINTMENT (OUTPATIENT)
Dept: UROLOGY | Facility: CLINIC | Age: 74
End: 2024-10-03
Payer: MEDICARE

## 2024-10-03 DIAGNOSIS — N52.9 ERECTILE DYSFUNCTION, UNSPECIFIED ERECTILE DYSFUNCTION TYPE: Primary | ICD-10-CM

## 2024-10-03 PROCEDURE — 1157F ADVNC CARE PLAN IN RCRD: CPT | Performed by: STUDENT IN AN ORGANIZED HEALTH CARE EDUCATION/TRAINING PROGRAM

## 2024-10-03 PROCEDURE — 99213 OFFICE O/P EST LOW 20 MIN: CPT | Performed by: STUDENT IN AN ORGANIZED HEALTH CARE EDUCATION/TRAINING PROGRAM

## 2024-10-03 NOTE — PROGRESS NOTES
Subjective   Patient ID: Anatoly Rice is a 73 y.o. male who presents for ED.    HPI  73 y.o. male who presents for ED. Was kindly referred by Dr. Salazar.     He is doing well with the increased dosage of Viagra for ED. He has improved urinary symptoms.    Hx of green light PVP October 2022.     He has  hx of cardiac issues. He has Rx for nitroglycerine but has never used. Ever since he had to change his blood pressure medications he is having more ED issues.      Lab Results   Component Value Date    PSA 1.46 04/15/2024    PSA 1.7 03/16/2023    PSA 3.9 03/03/2022    PSA 2.2 01/27/2021    PSA 2.6 12/12/2019       Review of Systems  A complete review of systems was performed. All systems are noted to be negative unless indicated in the history of present illness, impression, active problem list, or past histories.     Objective   Physical Exam  General: Well developed, well nourished, alert and cooperative, appears in no acute distress  Eyes: Non-injected conjunctiva, sclera clear, no proptosis  Cardiac: Extremities are warm and well perfused. No edema, cyanosis or pallor.   Lungs: Breathing is easy, non-labored. Speaking in clear and complete sentences. Normal diaphragmatic movement.  Abdomen: soft, non-tender  MSK: Ambulatory with steady gait, unassisted  Neuro: alert and oriented to person, place and time  Psych: Demonstrates good judgement and reason, without hallucinations, abnormal affect or abnormal behaviors.  Skin: no obvious lesions, no rashes.  : phallus uncircumcised, normal in size, no plaques or lesions. Testicles normal in size and texture, no masses  Left inguinal canal examined, no obvious hernia  TAYLOR: not done    Assessment/Plan   73 y.o. male who presents for ED. Satisfied after increasing the dose of sildenafil to 100mg    Hx of green light PVP October 2022.     He has  hx of cardiac issues. He has Rx for nitroglycerine but has never used. Ever since he had to change his blood pressure  medications he is having more ED issues.      He is doing well with the increased dosage of Viagra for ED. He has improved urinary symptoms.    I personally reviewed the medical records of the patient.     We discussed importance of never taking nitroglycerin with viagra together due to life threatening adverse reaction. He expressed understanding.     If the patient notices any left groin pain a CT abdomen is recommended.    Plan:  If recurrent left flank/groin pain, obtain non-contrast CT a/p  Continue Viagra 100 mg. Side effects reviewed.   Follow up as needed.    Diagnoses and all orders for this visit:  Erectile dysfunction, unspecified erectile dysfunction type      Scribe Attestation  By signing my name below, Gabriela PATEL Scribe   attest that this documentation has been prepared under the direction and in the presence of Anil Kurtz MD.

## 2024-10-15 ENCOUNTER — APPOINTMENT (OUTPATIENT)
Dept: PRIMARY CARE | Facility: CLINIC | Age: 74
End: 2024-10-15
Payer: MEDICARE

## 2024-10-15 ASSESSMENT — ENCOUNTER SYMPTOMS
SHORTNESS OF BREATH: 0
FEVER: 0
ABDOMINAL PAIN: 0
NAUSEA: 0
CHILLS: 0
HEADACHES: 0
DIARRHEA: 0
APPETITE CHANGE: 0
VOMITING: 0
COUGH: 0

## 2024-10-15 NOTE — PROGRESS NOTES
[facility]: [facility]  MEDICARE WELLNESS EXAM      Anatoly Rice is a 73 y.o. male that is presenting today for Medicare Annual Wellness Visit Subsequent.    Assessment/Plan    Diagnoses and all orders for this visit:  Annual physical exam  Atherosclerosis of native coronary artery of native heart without angina pectoris  Benign prostatic hyperplasia without urinary obstruction  Hypercholesterolemia  Primary hypertension  -     CBC and Auto Differential; Future  -     Comprehensive Metabolic Panel; Future  -     Hemoglobin A1C; Future  -     Lipid Panel; Future  -     TSH with reflex to Free T4 if abnormal; Future  Erectile dysfunction, unspecified erectile dysfunction type  -     sildenafil (Viagra) 100 mg tablet; Take 1 tablet (100 mg) by mouth once daily as needed for erectile dysfunction.  Hyperglycemia  -     Hemoglobin A1C; Future  Mixed hyperlipidemia  -     Lipid Panel; Future  Vitamin D deficiency  -     Vitamin D 25-Hydroxy,Total (for eval of Vitamin D levels); Future  Encounter for prostate cancer screening  -     Prostate Specific Antigen; Future  Other orders  -     Follow Up In Primary Care - Medicare Annual  -     Follow Up In Primary Care - Established; Future    ADVANCED CARE PLANNING  Advanced Care Planning was discussed with patient:  The patient has an active advanced care plan on file. The patient has an active surrogate decision-maker on file.  Encouraged the patient to confirm that Living Will and Healthcare Power of  (HCPoA) are accurate and up to date.  Encouraged the patient to confirm that our office be provided a copy of any documentation in the event that anything changes.    ACTIVITIES OF DAILY LIVING  Basic ADLs:  Bathing: Independent, Dressing: Independent, Toileting: Independent, Transferring: Independent, Continence: Independent, Feeding: Independent.    Instrumental ADLs:  Ability to use phone: Independent, Shopping: Independent, Cooking:  Independent, House-keeping: Independent, Laundry: Independent, Transportation: Independent, Medication Management: Independent, Finance Management: Independent.    Subjective   HPI  This patient presents today for annual physical, Medicare wellness exam.  Discussed screening/prevention, healthy lifestyle and code status.   Reviewed the patient's wishes regarding decision making.    Consultant visits and notes reviewed: Urology, Cardiology    Stable from a functional standpoint in regard to ADLs and IADLs.  No recent falls are reported.    The patient denies chest pain and shortness of breath.  No exertion-provoked or anginal-type symptoms are reported.    Review of Systems   Constitutional:  Negative for appetite change, chills and fever.   Respiratory:  Negative for cough and shortness of breath.    Cardiovascular:  Negative for chest pain.   Gastrointestinal:  Negative for abdominal pain, diarrhea, nausea and vomiting.   Neurological:  Negative for headaches.   All other systems reviewed and are negative.    Objective   Vitals:    10/16/24 1253   BP: 138/72   Pulse: 60   Temp: 36.2 °C (97.2 °F)   SpO2: 97%      Body mass index is 24.72 kg/m².  Physical Exam  Vitals reviewed.   Constitutional:       General: He is not in acute distress.     Appearance: He is not toxic-appearing.   HENT:      Head: Normocephalic and atraumatic.      Mouth/Throat:      Mouth: Mucous membranes are moist.   Eyes:      Pupils: Pupils are equal, round, and reactive to light.   Cardiovascular:      Rate and Rhythm: Normal rate and regular rhythm.      Heart sounds: No murmur heard.  Pulmonary:      Breath sounds: Normal breath sounds. No wheezing, rhonchi or rales.   Abdominal:      General: There is no distension.      Palpations: Abdomen is soft.   Musculoskeletal:      Right lower leg: No edema.      Left lower leg: No edema.   Neurological:      General: No focal deficit present.      Mental Status: He is alert and oriented to person,  "place, and time.       Diagnostic Results   Lab Results   Component Value Date    GLUCOSE 93 04/15/2024    CALCIUM 9.0 04/15/2024     04/15/2024    K 4.3 04/15/2024    CO2 28 04/15/2024     04/15/2024    BUN 20 04/15/2024    CREATININE 1.18 04/15/2024     Lab Results   Component Value Date    ALT 17 04/15/2024    AST 20 04/15/2024    ALKPHOS 57 04/15/2024    BILITOT 1.1 04/15/2024     Lab Results   Component Value Date    WBC 6.4 04/15/2024    HGB 16.2 04/15/2024    HCT 48.6 04/15/2024    MCV 87 04/15/2024     04/15/2024     Lab Results   Component Value Date    CHOL 141 04/15/2024    CHOL 159 03/16/2023    CHOL 147 03/03/2022     Lab Results   Component Value Date    HDL 46.4 04/15/2024    HDL 51 03/16/2023    HDL 51 03/03/2022     Lab Results   Component Value Date    LDLCALC 79 04/15/2024    LDLCALC 92 03/16/2023    LDLCALC 84 03/03/2022     Lab Results   Component Value Date    TRIG 76 04/15/2024    TRIG 80 03/16/2023    TRIG 62 03/03/2022     No components found for: \"CHOLHDL\"  Lab Results   Component Value Date    HGBA1C 5.6 04/15/2024     Other labs not included in the list above reviewed either before or during this encounter.    History   Past Medical History:   Diagnosis Date    Atherosclerotic heart disease of native coronary artery with unstable angina pectoris     Coronary artery disease with unstable angina pectoris, unspecified vessel or lesion type, unspecified whether native or transplanted heart    Benign prostatic hyperplasia     Chronic rhinitis     Rhinitis    Encounter for immunization 12/23/2013    Need for prophylactic vaccination and inoculation against influenza    Encounter for immunization 12/23/2013    Need for pneumococcal vaccination    Erectile dysfunction     Heart disease     Hypertension     Personal history of diseases of the skin and subcutaneous tissue     History of atopic dermatitis    Personal history of other diseases of male genital organs     History of " benign prostatic hypertrophy    Personal history of other diseases of the circulatory system     History of hypertension    Personal history of other diseases of the circulatory system     History of angina pectoris    Personal history of other diseases of the digestive system     History of hemorrhoids    Personal history of other diseases of the respiratory system     History of upper respiratory infection    Unspecified atherosclerosis     Atherosclerosis    Urinary incontinence      Past Surgical History:   Procedure Laterality Date    COLONOSCOPY  11/21/2012    Complete Colonoscopy    CORONARY ARTERY BYPASS GRAFT  11/21/2012    CABG    HERNIA REPAIR      LASER OF PROSTATE W/ GREEN LIGHT PVP  10/2022    OTHER SURGICAL HISTORY  04/13/2021    Hernia repair    PROSTATE SURGERY      TONSILLECTOMY  11/21/2012    Tonsillectomy With Adenoidectomy     Family History   Problem Relation Name Age of Onset    Breast cancer Mother Adele     Heart disease Mother Adele     Hypertension Mother Adele     Stroke Mother Adele     Cancer Mother Adele      Social History     Socioeconomic History    Marital status:      Spouse name: Not on file    Number of children: Not on file    Years of education: Not on file    Highest education level: Not on file   Occupational History    Not on file   Tobacco Use    Smoking status: Never     Passive exposure: Never    Smokeless tobacco: Never   Vaping Use    Vaping status: Never Used   Substance and Sexual Activity    Alcohol use: Yes     Alcohol/week: 2.0 standard drinks of alcohol     Types: 2 Glasses of wine per week    Drug use: Never    Sexual activity: Yes     Partners: Female     Birth control/protection: Coitus interruptus   Other Topics Concern    Not on file   Social History Narrative    Not on file     Social Drivers of Health     Financial Resource Strain: Not on file   Food Insecurity: Not on file   Transportation Needs: Not on file   Physical Activity: Not on  file   Stress: Not on file   Social Connections: Not on file   Intimate Partner Violence: Not on file   Housing Stability: Not on file     Allergies   Allergen Reactions    Other GI Upset     Unknown Antibiotic     Adhesive Tape-Silicones Rash    Cat Dander Agitation and Other    Latex Rash     Latex/adhesive    Lisinopril Cough     Current Outpatient Medications on File Prior to Visit   Medication Sig Dispense Refill    aspirin 81 mg EC tablet Take 1 tablet (81 mg) by mouth once daily.      calcium carbonate/vitamin D3 (CALCIUM 600 + D,3, ORAL) Take by mouth 2 times a day. Vitamin d 800 units      folic acid (Folvite) 400 mcg tablet Take 1 tablet (400 mcg) by mouth once daily.      glucosamine HCl/chondroitin vásquez (GLUCOSAMINE-CHONDROITIN ORAL) Take 500 mg by mouth once daily.      levocarnitine HCl (ACETYL-L-CARNITINE MISC) Take 400 mg by mouth once daily.      losartan (Cozaar) 50 mg tablet Take 1 tablet (50 mg) by mouth once daily. 90 tablet 3    meclizine (Antivert) 25 mg tablet Take 3 tablets (75 mg) by mouth once daily as needed.      metoprolol succinate XL (Toprol-XL) 25 mg 24 hr tablet Take 0.5 tablets (12.5 mg) by mouth 2 times a day. 45 tablet 3    nitroglycerin (Nitrostat) 0.4 mg SL tablet Place 1 tablet (0.4 mg) under the tongue if needed.      rosuvastatin (Crestor) 40 mg tablet TAKE 1 TABLET ONCE DAILY 90 tablet 3    [DISCONTINUED] sildenafil (Viagra) 50 mg tablet Take 1 tablet (50 mg) by mouth once daily as needed for erectile dysfunction. 30 tablet 2     No current facility-administered medications on file prior to visit.     Immunization History   Administered Date(s) Administered    Flu vaccine, quadrivalent, high-dose, preservative free, age 65y+ (FLUZONE) 09/01/2020, 10/07/2022    Flu vaccine, trivalent, preservative free, HIGH-DOSE, age 65y+ (Fluzone) 11/13/2018    Influenza, Seasonal, Quadrivalent, Adjuvanted 09/28/2020, 10/20/2021    Influenza, Unspecified 12/23/2011    Influenza, injectable,  quadrivalent 12/29/2017, 11/04/2019    Influenza, seasonal, injectable 12/21/2012, 12/23/2013    Pfizer COVID-19 vaccine, 12 years and older, (30mcg/0.3mL) (Comirnaty) 01/22/2024, 10/08/2024    Pfizer COVID-19 vaccine, bivalent, age 12 years and older (30 mcg/0.3 mL) 10/07/2022    Pfizer Gray Cap SARS-CoV-2 07/22/2022    Pfizer Purple Cap SARS-CoV-2 03/01/2021, 03/22/2021, 10/20/2021    Pneumococcal conjugate vaccine, 13-valent (PREVNAR 13) 12/29/2017    Pneumococcal polysaccharide vaccine, 23-valent, age 2 years and older (PNEUMOVAX 23) 12/23/2013    Tdap vaccine, age 7 year and older (BOOSTRIX, ADACEL) 04/18/2014, 09/21/2021     Patient's medical history was reviewed and updated either before or during this encounter.     Miguel A Salazar MD

## 2024-10-16 ENCOUNTER — OFFICE VISIT (OUTPATIENT)
Dept: PRIMARY CARE | Facility: CLINIC | Age: 74
End: 2024-10-16
Payer: MEDICARE

## 2024-10-16 VITALS
WEIGHT: 144 LBS | BODY MASS INDEX: 24.59 KG/M2 | HEIGHT: 64 IN | TEMPERATURE: 97.2 F | SYSTOLIC BLOOD PRESSURE: 138 MMHG | OXYGEN SATURATION: 97 % | DIASTOLIC BLOOD PRESSURE: 72 MMHG | HEART RATE: 60 BPM

## 2024-10-16 DIAGNOSIS — R73.9 HYPERGLYCEMIA: ICD-10-CM

## 2024-10-16 DIAGNOSIS — E55.9 VITAMIN D DEFICIENCY: ICD-10-CM

## 2024-10-16 DIAGNOSIS — I25.10 ATHEROSCLEROSIS OF NATIVE CORONARY ARTERY OF NATIVE HEART WITHOUT ANGINA PECTORIS: ICD-10-CM

## 2024-10-16 DIAGNOSIS — N52.9 ERECTILE DYSFUNCTION, UNSPECIFIED ERECTILE DYSFUNCTION TYPE: ICD-10-CM

## 2024-10-16 DIAGNOSIS — E78.2 MIXED HYPERLIPIDEMIA: ICD-10-CM

## 2024-10-16 DIAGNOSIS — E78.00 HYPERCHOLESTEROLEMIA: ICD-10-CM

## 2024-10-16 DIAGNOSIS — N40.0 BENIGN PROSTATIC HYPERPLASIA WITHOUT URINARY OBSTRUCTION: ICD-10-CM

## 2024-10-16 DIAGNOSIS — Z00.00 ANNUAL PHYSICAL EXAM: Primary | ICD-10-CM

## 2024-10-16 DIAGNOSIS — Z12.5 ENCOUNTER FOR PROSTATE CANCER SCREENING: ICD-10-CM

## 2024-10-16 DIAGNOSIS — I10 PRIMARY HYPERTENSION: ICD-10-CM

## 2024-10-16 PROCEDURE — G0439 PPPS, SUBSEQ VISIT: HCPCS | Performed by: INTERNAL MEDICINE

## 2024-10-16 PROCEDURE — 1159F MED LIST DOCD IN RCRD: CPT | Performed by: INTERNAL MEDICINE

## 2024-10-16 PROCEDURE — 1157F ADVNC CARE PLAN IN RCRD: CPT | Performed by: INTERNAL MEDICINE

## 2024-10-16 PROCEDURE — 3078F DIAST BP <80 MM HG: CPT | Performed by: INTERNAL MEDICINE

## 2024-10-16 PROCEDURE — 3075F SYST BP GE 130 - 139MM HG: CPT | Performed by: INTERNAL MEDICINE

## 2024-10-16 PROCEDURE — 1036F TOBACCO NON-USER: CPT | Performed by: INTERNAL MEDICINE

## 2024-10-16 PROCEDURE — 99215 OFFICE O/P EST HI 40 MIN: CPT | Performed by: INTERNAL MEDICINE

## 2024-10-16 PROCEDURE — 1126F AMNT PAIN NOTED NONE PRSNT: CPT | Performed by: INTERNAL MEDICINE

## 2024-10-16 RX ORDER — SILDENAFIL 100 MG/1
100 TABLET, FILM COATED ORAL DAILY PRN
Qty: 30 TABLET | Refills: 2 | Status: SHIPPED | OUTPATIENT
Start: 2024-10-16 | End: 2025-01-14

## 2024-10-16 ASSESSMENT — PATIENT HEALTH QUESTIONNAIRE - PHQ9
SUM OF ALL RESPONSES TO PHQ9 QUESTIONS 1 AND 2: 0
1. LITTLE INTEREST OR PLEASURE IN DOING THINGS: NOT AT ALL
2. FEELING DOWN, DEPRESSED OR HOPELESS: NOT AT ALL

## 2024-10-16 ASSESSMENT — PAIN SCALES - GENERAL: PAINLEVEL_OUTOF10: 0-NO PAIN

## 2024-12-09 DIAGNOSIS — I10 PRIMARY HYPERTENSION: ICD-10-CM

## 2024-12-09 RX ORDER — METOPROLOL SUCCINATE 25 MG/1
12.5 TABLET, EXTENDED RELEASE ORAL 2 TIMES DAILY
Qty: 45 TABLET | Refills: 3 | Status: SHIPPED | OUTPATIENT
Start: 2024-12-09 | End: 2024-12-09 | Stop reason: SDUPTHER

## 2024-12-09 RX ORDER — METOPROLOL SUCCINATE 25 MG/1
12.5 TABLET, EXTENDED RELEASE ORAL 2 TIMES DAILY
Qty: 45 TABLET | Refills: 3 | Status: SHIPPED | OUTPATIENT
Start: 2024-12-09

## 2024-12-22 NOTE — PROGRESS NOTES
Subjective      Chief Complaint   Patient presents with    6 month follow up          History of having open heart surgery in 2011 he has hypertension and hyperlipidemia.  Is active and is hiking twice a week.  He is not complaining of chest discomfort.  NO PND or orthopnea.  The legs are not swelling on him.  He does not complain of palpitations.    He will get some heartburn and not sure why.  Like a reflux and burning in the throat   The BP at home is running 135-145 and is mildly up today  The chol was checked in April and is low           Review of Systems   Constitutional: Negative. Negative for chills and fever.   HENT: Negative.     Eyes: Negative.    Respiratory: Negative.  Negative for cough and shortness of breath.    Endocrine: Negative.    Skin: Negative.    Musculoskeletal: Negative.  Negative for falls.   Gastrointestinal: Negative.    Genitourinary: Negative.    Neurological: Negative.    All other systems reviewed and are negative.       Past Surgical History:   Procedure Laterality Date    COLONOSCOPY  11/21/2012    Complete Colonoscopy    CORONARY ARTERY BYPASS GRAFT  11/21/2012    CABG    CORONARY STENT PLACEMENT  2007    HERNIA REPAIR      LASER OF PROSTATE W/ GREEN LIGHT PVP  10/2022    OTHER SURGICAL HISTORY  04/13/2021    Hernia repair    PROSTATE SURGERY      TONSILLECTOMY  11/21/2012    Tonsillectomy With Adenoidectomy        Active Ambulatory Problems     Diagnosis Date Noted    Atherosclerotic heart disease of native coronary artery without angina pectoris 08/17/2023    Benign prostatic hyperplasia without urinary obstruction 08/17/2023    Cervical radiculopathy 08/17/2023    Deviated nasal septum 08/17/2023    Epistaxis 08/17/2023    Hearing loss 08/17/2023    History of angioplasty 08/17/2023    Hypercholesterolemia 08/17/2023    Hypertension 08/17/2023    Inguinal hernia, left 08/17/2023    Intermittent chest pain 08/17/2023    Osteoarthritis 08/17/2023    Presence of aortocoronary  bypass graft 08/17/2023    Right shoulder injury 08/17/2023    Right shoulder pain 08/17/2023     Resolved Ambulatory Problems     Diagnosis Date Noted    No Resolved Ambulatory Problems     Past Medical History:   Diagnosis Date    Atherosclerotic heart disease of native coronary artery with unstable angina pectoris     Benign prostatic hyperplasia     Chronic rhinitis     Encounter for immunization 12/23/2013    Encounter for immunization 12/23/2013    Erectile dysfunction     Heart disease     Personal history of diseases of the skin and subcutaneous tissue     Personal history of other diseases of male genital organs     Personal history of other diseases of the circulatory system     Personal history of other diseases of the circulatory system     Personal history of other diseases of the digestive system     Personal history of other diseases of the respiratory system     Unspecified atherosclerosis     Urinary incontinence         Visit Vitals  /76   Pulse 63   Wt 66.2 kg (146 lb)   SpO2 98%   BMI 25.06 kg/m²   Smoking Status Never   BSA 1.73 m²        Objective     Constitutional:       Appearance: Healthy appearance.   Eyes:      Pupils: Pupils are equal, round, and reactive to light.   Neck:      Vascular: No JVR. JVD normal.   Pulmonary:      Effort: Pulmonary effort is normal.      Breath sounds: Normal breath sounds.   Cardiovascular:      PMI at left midclavicular line. Normal rate. Regular rhythm. Normal S1. Normal S2.       Murmurs: There is a grade 1to 2/6 midsystolic murmur.      No gallop.  No click. No rub.   Pulses:     Intact distal pulses.   Edema:     Peripheral edema absent.   Abdominal:      Palpations: Abdomen is soft.      Tenderness: There is no abdominal tenderness.   Musculoskeletal: Normal range of motion.      Extremities: No clubbing present.Skin:     General: Skin is warm and dry.   Neurological:      General: No focal deficit present.            Lab Review:         Lab  "Results   Component Value Date    CHOL 141 04/15/2024    CHOL 159 03/16/2023    CHOL 147 03/03/2022     Lab Results   Component Value Date    HDL 46.4 04/15/2024    HDL 51 03/16/2023    HDL 51 03/03/2022     Lab Results   Component Value Date    LDLCALC 79 04/15/2024    LDLCALC 92 03/16/2023    LDLCALC 84 03/03/2022     Lab Results   Component Value Date    TRIG 76 04/15/2024    TRIG 80 03/16/2023    TRIG 62 03/03/2022     No components found for: \"CHOLHDL\"     Assessment/Plan     Atherosclerotic heart disease of native coronary artery without angina pectoris  He I doing well andd no angina and no chf.  He is 11 from the [facility] and no problem.  Continue as is    Hypertension  The BP has been up and is borderline at home.  Will increase the losartan    Hypercholesterolemia  Is controlled     "

## 2024-12-23 ENCOUNTER — OFFICE VISIT (OUTPATIENT)
Dept: CARDIOLOGY | Facility: CLINIC | Age: 74
End: 2024-12-23
Payer: MEDICARE

## 2024-12-23 VITALS
OXYGEN SATURATION: 98 % | BODY MASS INDEX: 25.06 KG/M2 | HEART RATE: 63 BPM | WEIGHT: 146 LBS | SYSTOLIC BLOOD PRESSURE: 142 MMHG | DIASTOLIC BLOOD PRESSURE: 76 MMHG

## 2024-12-23 DIAGNOSIS — I25.10 ATHEROSCLEROSIS OF NATIVE CORONARY ARTERY OF NATIVE HEART WITHOUT ANGINA PECTORIS: Primary | ICD-10-CM

## 2024-12-23 DIAGNOSIS — I10 PRIMARY HYPERTENSION: ICD-10-CM

## 2024-12-23 DIAGNOSIS — E78.00 HYPERCHOLESTEROLEMIA: ICD-10-CM

## 2024-12-23 PROCEDURE — 3077F SYST BP >= 140 MM HG: CPT | Performed by: INTERNAL MEDICINE

## 2024-12-23 PROCEDURE — 1157F ADVNC CARE PLAN IN RCRD: CPT | Performed by: INTERNAL MEDICINE

## 2024-12-23 PROCEDURE — 99213 OFFICE O/P EST LOW 20 MIN: CPT | Performed by: INTERNAL MEDICINE

## 2024-12-23 PROCEDURE — 1036F TOBACCO NON-USER: CPT | Performed by: INTERNAL MEDICINE

## 2024-12-23 PROCEDURE — 1126F AMNT PAIN NOTED NONE PRSNT: CPT | Performed by: INTERNAL MEDICINE

## 2024-12-23 PROCEDURE — 3078F DIAST BP <80 MM HG: CPT | Performed by: INTERNAL MEDICINE

## 2024-12-23 PROCEDURE — 1159F MED LIST DOCD IN RCRD: CPT | Performed by: INTERNAL MEDICINE

## 2024-12-23 RX ORDER — LOSARTAN POTASSIUM 100 MG/1
100 TABLET ORAL DAILY
Qty: 90 TABLET | Refills: 3 | Status: SHIPPED | OUTPATIENT
Start: 2024-12-23 | End: 2025-12-23

## 2024-12-23 ASSESSMENT — ENCOUNTER SYMPTOMS
GASTROINTESTINAL NEGATIVE: 1
FEVER: 0
FALLS: 0
RESPIRATORY NEGATIVE: 1
CHILLS: 0
CONSTITUTIONAL NEGATIVE: 1
SHORTNESS OF BREATH: 0
COUGH: 0
LOSS OF SENSATION IN FEET: 0
DEPRESSION: 0
MUSCULOSKELETAL NEGATIVE: 1
EYES NEGATIVE: 1
ENDOCRINE NEGATIVE: 1
NEUROLOGICAL NEGATIVE: 1
OCCASIONAL FEELINGS OF UNSTEADINESS: 0

## 2024-12-23 ASSESSMENT — PAIN SCALES - GENERAL: PAINLEVEL_OUTOF10: 0-NO PAIN

## 2024-12-23 ASSESSMENT — PATIENT HEALTH QUESTIONNAIRE - PHQ9
1. LITTLE INTEREST OR PLEASURE IN DOING THINGS: NOT AT ALL
2. FEELING DOWN, DEPRESSED OR HOPELESS: NOT AT ALL
SUM OF ALL RESPONSES TO PHQ9 QUESTIONS 1 AND 2: 0

## 2025-03-05 PROBLEM — N32.0 BLADDER OUTFLOW OBSTRUCTION: Status: ACTIVE | Noted: 2025-03-05

## 2025-03-05 PROBLEM — R30.0 DYSURIA: Status: ACTIVE | Noted: 2025-03-05

## 2025-03-05 PROBLEM — R33.9 URINARY RETENTION: Status: ACTIVE | Noted: 2025-03-05

## 2025-03-05 PROBLEM — Z86.79 HISTORY OF ANGINA PECTORIS: Status: ACTIVE | Noted: 2025-03-05

## 2025-03-05 PROBLEM — M54.50 LOW BACK PAIN, UNSPECIFIED: Status: ACTIVE | Noted: 2022-07-24

## 2025-03-05 PROBLEM — Z86.79 HISTORY OF HYPERTENSION: Status: ACTIVE | Noted: 2025-03-05

## 2025-03-05 PROBLEM — R33.9 RETENTION OF URINE: Status: ACTIVE | Noted: 2022-07-24

## 2025-03-05 PROBLEM — I70.90 ARTERIOSCLEROTIC VASCULAR DISEASE: Status: ACTIVE | Noted: 2025-03-05

## 2025-03-05 PROBLEM — N40.0 BENIGN PROSTATIC HYPERPLASIA: Status: ACTIVE | Noted: 2022-07-24

## 2025-03-05 PROBLEM — I25.10 CORONARY ARTERY DISEASE: Status: ACTIVE | Noted: 2025-03-05

## 2025-03-05 PROBLEM — I25.10 ATHEROSCLEROSIS OF CORONARY ARTERY: Status: ACTIVE | Noted: 2022-10-24

## 2025-03-05 PROBLEM — J31.0 RHINITIS: Status: ACTIVE | Noted: 2025-03-05

## 2025-03-05 RX ORDER — SILDENAFIL 50 MG/1
50 TABLET, FILM COATED ORAL DAILY PRN
COMMUNITY
Start: 2024-12-02 | End: 2025-04-16 | Stop reason: ALTCHOICE

## 2025-04-04 LAB
25(OH)D3+25(OH)D2 SERPL-MCNC: 55 NG/ML (ref 30–100)
ALBUMIN SERPL-MCNC: 4.3 G/DL (ref 3.6–5.1)
ALP SERPL-CCNC: 56 U/L (ref 35–144)
ALT SERPL-CCNC: 14 U/L (ref 9–46)
ANION GAP SERPL CALCULATED.4IONS-SCNC: 9 MMOL/L (CALC) (ref 7–17)
AST SERPL-CCNC: 18 U/L (ref 10–35)
BASOPHILS # BLD AUTO: 41 CELLS/UL (ref 0–200)
BASOPHILS NFR BLD AUTO: 0.7 %
BILIRUB SERPL-MCNC: 0.9 MG/DL (ref 0.2–1.2)
BUN SERPL-MCNC: 18 MG/DL (ref 7–25)
CALCIUM SERPL-MCNC: 9 MG/DL (ref 8.6–10.3)
CHLORIDE SERPL-SCNC: 107 MMOL/L (ref 98–110)
CHOLEST SERPL-MCNC: 140 MG/DL
CHOLEST/HDLC SERPL: 2.9 (CALC)
CO2 SERPL-SCNC: 26 MMOL/L (ref 20–32)
CREAT SERPL-MCNC: 1.19 MG/DL (ref 0.7–1.28)
EGFRCR SERPLBLD CKD-EPI 2021: 63 ML/MIN/1.73M2
EOSINOPHIL # BLD AUTO: 242 CELLS/UL (ref 15–500)
EOSINOPHIL NFR BLD AUTO: 4.1 %
ERYTHROCYTE [DISTWIDTH] IN BLOOD BY AUTOMATED COUNT: 13.1 % (ref 11–15)
EST. AVERAGE GLUCOSE BLD GHB EST-MCNC: 123 MG/DL
EST. AVERAGE GLUCOSE BLD GHB EST-SCNC: 6.8 MMOL/L
GLUCOSE SERPL-MCNC: 92 MG/DL (ref 65–99)
HBA1C MFR BLD: 5.9 % OF TOTAL HGB
HCT VFR BLD AUTO: 48.3 % (ref 38.5–50)
HDLC SERPL-MCNC: 48 MG/DL
HGB BLD-MCNC: 15.9 G/DL (ref 13.2–17.1)
LDLC SERPL CALC-MCNC: 75 MG/DL (CALC)
LYMPHOCYTES # BLD AUTO: 1859 CELLS/UL (ref 850–3900)
LYMPHOCYTES NFR BLD AUTO: 31.5 %
MCH RBC QN AUTO: 29.1 PG (ref 27–33)
MCHC RBC AUTO-ENTMCNC: 32.9 G/DL (ref 32–36)
MCV RBC AUTO: 88.3 FL (ref 80–100)
MONOCYTES # BLD AUTO: 566 CELLS/UL (ref 200–950)
MONOCYTES NFR BLD AUTO: 9.6 %
NEUTROPHILS # BLD AUTO: 3192 CELLS/UL (ref 1500–7800)
NEUTROPHILS NFR BLD AUTO: 54.1 %
NONHDLC SERPL-MCNC: 92 MG/DL (CALC)
PLATELET # BLD AUTO: 172 THOUSAND/UL (ref 140–400)
PMV BLD REES-ECKER: 11.2 FL (ref 7.5–12.5)
POTASSIUM SERPL-SCNC: 4.4 MMOL/L (ref 3.5–5.3)
PROT SERPL-MCNC: 6.7 G/DL (ref 6.1–8.1)
PSA SERPL-MCNC: 1.56 NG/ML
RBC # BLD AUTO: 5.47 MILLION/UL (ref 4.2–5.8)
SODIUM SERPL-SCNC: 142 MMOL/L (ref 135–146)
TRIGL SERPL-MCNC: 85 MG/DL
TSH SERPL-ACNC: 0.58 MIU/L (ref 0.4–4.5)
WBC # BLD AUTO: 5.9 THOUSAND/UL (ref 3.8–10.8)

## 2025-04-13 ASSESSMENT — ENCOUNTER SYMPTOMS
FEVER: 0
SHORTNESS OF BREATH: 0
ABDOMINAL PAIN: 0

## 2025-04-13 NOTE — PROGRESS NOTES
[facility]: [facility]  PROGRESS NOTE      Anatoly Rice is a 74 y.o. male that is presenting today for Hypertension (6 mo fu).    Assessment/Plan   Diagnoses and all orders for this visit:  Atherosclerosis of native coronary artery of native heart without angina pectoris  Hypercholesterolemia  Benign prostatic hyperplasia without urinary obstruction  Primary hypertension  -     metoprolol succinate XL (Toprol-XL) 25 mg 24 hr tablet; Take 0.5 tablets (12.5 mg) by mouth 2 times a day.  Vitamin D deficiency  Skin lesion  -     Referral to Dermatology  Other orders  -     Follow Up In Primary Care - Established  -     Follow Up In Primary Care - Medicare Annual; Future    CAD, free of anginal symptoms.  He does follow with cardiology.    Hypertension controlled on metoprolol and losartan, continue as is.    Hyperlipidemia on the rosuvastatin, stable.    Impaired fasting glucose, discussed the implications for this, diet, exercise, etc.    Reviewed screening and prevention schedule.  He just got the updated shingles vaccine today.    Subjective   HPI  74 y.o. male here for follow up.  Stable from a functional standpoint in regard to ADLs and IADLs.  No recent falls are reported.  The patient denies chest pain and shortness of breath.  No exertion-provoked or anginal-type symptoms are reported.  Used last losartan Sunday - did not have refill - was out of med, feels that's why BP a little high.    Review of Systems   Constitutional:  Negative for fever.   Respiratory:  Negative for shortness of breath.    Cardiovascular:  Negative for chest pain.   Gastrointestinal:  Negative for abdominal pain.   All other systems reviewed and are negative.     Objective   Vitals:    04/16/25 1256   BP: 142/80   Pulse: 60   Temp: 36.5 °C (97.7 °F)   SpO2: 97%      Body mass index is 24.72 kg/m².  Physical Exam  Vitals reviewed.   Constitutional:       Appearance: Normal appearance.   Cardiovascular:      Rate and  "Rhythm: Normal rate and regular rhythm.      Heart sounds: No murmur heard.  Pulmonary:      Breath sounds: Normal breath sounds. No wheezing, rhonchi or rales.   Musculoskeletal:      Right lower leg: No edema.      Left lower leg: No edema.       Diagnostic Results   Lab Results   Component Value Date    GLUCOSE 92 04/03/2025    CALCIUM 9.0 04/03/2025     04/03/2025    K 4.4 04/03/2025    CO2 26 04/03/2025     04/03/2025    BUN 18 04/03/2025    CREATININE 1.19 04/03/2025     Lab Results   Component Value Date    ALT 14 04/03/2025    AST 18 04/03/2025    ALKPHOS 56 04/03/2025    BILITOT 0.9 04/03/2025     Lab Results   Component Value Date    WBC 5.9 04/03/2025    HGB 15.9 04/03/2025    HCT 48.3 04/03/2025    MCV 88.3 04/03/2025     04/03/2025     Lab Results   Component Value Date    CHOL 140 04/03/2025    CHOL 141 04/15/2024    CHOL 159 03/16/2023     Lab Results   Component Value Date    HDL 48 04/03/2025    HDL 46.4 04/15/2024    HDL 51 03/16/2023     Lab Results   Component Value Date    LDLCALC 75 04/03/2025    LDLCALC 79 04/15/2024    LDLCALC 92 03/16/2023     Lab Results   Component Value Date    TRIG 85 04/03/2025    TRIG 76 04/15/2024    TRIG 80 03/16/2023     No components found for: \"CHOLHDL\"  Lab Results   Component Value Date    HGBA1C 5.9 (H) 04/03/2025     Other labs not included in the list above were reviewed either before or during this encounter.    History    Past Medical History:   Diagnosis Date    Atherosclerotic heart disease of native coronary artery with unstable angina pectoris     Coronary artery disease with unstable angina pectoris, unspecified vessel or lesion type, unspecified whether native or transplanted heart    Benign prostatic hyperplasia     Chronic rhinitis     Rhinitis    Encounter for immunization 12/23/2013    Need for prophylactic vaccination and inoculation against influenza    Encounter for immunization 12/23/2013    Need for pneumococcal vaccination "    Erectile dysfunction     Heart disease     Hypertension     Personal history of diseases of the skin and subcutaneous tissue     History of atopic dermatitis    Personal history of other diseases of male genital organs     History of benign prostatic hypertrophy    Personal history of other diseases of the circulatory system     History of hypertension    Personal history of other diseases of the circulatory system     History of angina pectoris    Personal history of other diseases of the digestive system     History of hemorrhoids    Personal history of other diseases of the respiratory system     History of upper respiratory infection    Unspecified atherosclerosis     Atherosclerosis    Urinary incontinence      Past Surgical History:   Procedure Laterality Date    COLONOSCOPY  11/21/2012    Complete Colonoscopy    CORONARY ARTERY BYPASS GRAFT  11/21/2012    CABG    CORONARY STENT PLACEMENT  2007    HERNIA REPAIR      LASER OF PROSTATE W/ GREEN LIGHT PVP  10/2022    OTHER SURGICAL HISTORY  04/13/2021    Hernia repair    PROSTATE SURGERY      TONSILLECTOMY  11/21/2012    Tonsillectomy With Adenoidectomy     Family History   Problem Relation Name Age of Onset    Breast cancer Mother Adele     Heart disease Mother Adele     Hypertension Mother Adele     Stroke Mother Adele     Cancer Mother Adele      Social History     Socioeconomic History    Marital status:      Spouse name: Not on file    Number of children: Not on file    Years of education: Not on file    Highest education level: Not on file   Occupational History    Not on file   Tobacco Use    Smoking status: Never     Passive exposure: Never    Smokeless tobacco: Never   Vaping Use    Vaping status: Never Used   Substance and Sexual Activity    Alcohol use: Yes     Alcohol/week: 2.0 standard drinks of alcohol     Types: 2 Glasses of wine per week    Drug use: Never    Sexual activity: Yes     Partners: Female     Birth  control/protection: Coitus interruptus   Other Topics Concern    Not on file   Social History Narrative    Not on file     Social Drivers of Health     Financial Resource Strain: Not on file   Food Insecurity: Not on file   Transportation Needs: Not on file   Physical Activity: Not on file   Stress: Not on file   Social Connections: Not on file   Intimate Partner Violence: Not on file   Housing Stability: Not on file     Allergies   Allergen Reactions    Other GI Upset     Unknown Antibiotic     Adhesive Tape-Silicones Rash    Cat Dander Agitation and Other    Latex Rash     Latex/adhesive    Lisinopril Cough     Current Outpatient Medications on File Prior to Visit   Medication Sig Dispense Refill    aspirin 81 mg EC tablet Take 1 tablet (81 mg) by mouth once daily.      calcium carbonate/vitamin D3 (CALCIUM 600 + D,3, ORAL) Take by mouth 2 times a day. Vitamin d 800 units      folic acid (Folvite) 400 mcg tablet Take 1 tablet (400 mcg) by mouth once daily.      glucosamine HCl/chondroitin vásquez (GLUCOSAMINE-CHONDROITIN ORAL) Take 500 mg by mouth once daily.      levocarnitine HCl (ACETYL-L-CARNITINE MISC) Take 400 mg by mouth once daily.      losartan (Cozaar) 100 mg tablet Take 1 tablet (100 mg) by mouth once daily. 90 tablet 3    naproxen (Naprosyn) 500 mg tablet Take 1 tablet (500 mg) by mouth 2 times daily (morning and late afternoon). prn      nitroglycerin (Nitrostat) 0.4 mg SL tablet Place 1 tablet (0.4 mg) under the tongue if needed.      rosuvastatin (Crestor) 40 mg tablet TAKE 1 TABLET ONCE DAILY 90 tablet 3    [DISCONTINUED] metoprolol succinate XL (Toprol-XL) 25 mg 24 hr tablet Take 0.5 tablets (12.5 mg) by mouth 2 times a day. 45 tablet 3    [DISCONTINUED] sildenafil (Viagra) 50 mg tablet Take 1 tablet (50 mg) by mouth once daily as needed for erectile dysfunction.      [DISCONTINUED] meclizine (Antivert) 25 mg tablet Take 3 tablets (75 mg) by mouth once daily as needed.      [DISCONTINUED] sildenafil  (Viagra) 100 mg tablet Take 1 tablet (100 mg) by mouth once daily as needed for erectile dysfunction. 30 tablet 2     No current facility-administered medications on file prior to visit.     Immunization History   Administered Date(s) Administered    COVID-19, mRNA, LNP-S, PF, 30 mcg/0.3 mL dose 03/01/2021, 03/22/2021, 10/20/2021    Flu vaccine, quadrivalent, high-dose, preservative free, age 65y+ (FLUZONE) 09/01/2020, 10/07/2022, 09/28/2023    Flu vaccine, trivalent, preservative free, HIGH-DOSE, age 65y+ (Fluzone) 11/13/2018, 10/08/2024    Influenza, Seasonal, Quadrivalent, Adjuvanted 09/28/2020, 10/20/2021    Influenza, Unspecified 12/23/2011    Influenza, injectable, quadrivalent 12/29/2017, 11/04/2019    Influenza, seasonal, injectable 12/21/2012, 12/23/2013    Pfizer COVID-19 vaccine, 12 years and older, (30mcg/0.3mL) (Comirnaty) 01/22/2024, 10/08/2024    Pfizer COVID-19 vaccine, bivalent, age 12 years and older (30 mcg/0.3 mL) 10/07/2022    Pfizer Gray Cap SARS-CoV-2 07/22/2022    Pneumococcal conjugate vaccine, 13-valent (PREVNAR 13) 12/29/2017    Pneumococcal polysaccharide vaccine, 23-valent, age 2 years and older (PNEUMOVAX 23) 12/23/2013    RSV, 60 Years And Older (AREXVY) 10/30/2023    Tdap vaccine, age 7 year and older (BOOSTRIX, ADACEL) 04/18/2014, 09/21/2021    Zoster vaccine, recombinant, adult (SHINGRIX) 02/12/2025     Patient's medical history was reviewed and updated either before or during this encounter.       Miguel A Salazar MD

## 2025-04-16 ENCOUNTER — OFFICE VISIT (OUTPATIENT)
Dept: PRIMARY CARE | Facility: CLINIC | Age: 75
End: 2025-04-16
Payer: MEDICARE

## 2025-04-16 VITALS
TEMPERATURE: 97.7 F | BODY MASS INDEX: 24.59 KG/M2 | WEIGHT: 144 LBS | HEART RATE: 60 BPM | OXYGEN SATURATION: 97 % | DIASTOLIC BLOOD PRESSURE: 80 MMHG | SYSTOLIC BLOOD PRESSURE: 142 MMHG | HEIGHT: 64 IN

## 2025-04-16 DIAGNOSIS — I10 PRIMARY HYPERTENSION: ICD-10-CM

## 2025-04-16 DIAGNOSIS — E55.9 VITAMIN D DEFICIENCY: ICD-10-CM

## 2025-04-16 DIAGNOSIS — I25.10 ATHEROSCLEROSIS OF NATIVE CORONARY ARTERY OF NATIVE HEART WITHOUT ANGINA PECTORIS: Primary | ICD-10-CM

## 2025-04-16 DIAGNOSIS — N40.0 BENIGN PROSTATIC HYPERPLASIA WITHOUT URINARY OBSTRUCTION: ICD-10-CM

## 2025-04-16 DIAGNOSIS — E78.00 HYPERCHOLESTEROLEMIA: ICD-10-CM

## 2025-04-16 DIAGNOSIS — L98.9 SKIN LESION: ICD-10-CM

## 2025-04-16 PROCEDURE — G2211 COMPLEX E/M VISIT ADD ON: HCPCS | Performed by: INTERNAL MEDICINE

## 2025-04-16 PROCEDURE — 1036F TOBACCO NON-USER: CPT | Performed by: INTERNAL MEDICINE

## 2025-04-16 PROCEDURE — 99214 OFFICE O/P EST MOD 30 MIN: CPT | Performed by: INTERNAL MEDICINE

## 2025-04-16 PROCEDURE — 1157F ADVNC CARE PLAN IN RCRD: CPT | Performed by: INTERNAL MEDICINE

## 2025-04-16 PROCEDURE — 3077F SYST BP >= 140 MM HG: CPT | Performed by: INTERNAL MEDICINE

## 2025-04-16 PROCEDURE — 1126F AMNT PAIN NOTED NONE PRSNT: CPT | Performed by: INTERNAL MEDICINE

## 2025-04-16 PROCEDURE — 3079F DIAST BP 80-89 MM HG: CPT | Performed by: INTERNAL MEDICINE

## 2025-04-16 PROCEDURE — 1159F MED LIST DOCD IN RCRD: CPT | Performed by: INTERNAL MEDICINE

## 2025-04-16 RX ORDER — NAPROXEN 500 MG/1
500 TABLET ORAL
COMMUNITY

## 2025-04-16 RX ORDER — METOPROLOL SUCCINATE 25 MG/1
12.5 TABLET, EXTENDED RELEASE ORAL 2 TIMES DAILY
Qty: 45 TABLET | Refills: 3 | Status: SHIPPED | OUTPATIENT
Start: 2025-04-16

## 2025-04-16 ASSESSMENT — PAIN SCALES - GENERAL: PAINLEVEL_OUTOF10: 0-NO PAIN

## 2025-06-16 ENCOUNTER — OFFICE VISIT (OUTPATIENT)
Dept: CARDIOLOGY | Facility: CLINIC | Age: 75
End: 2025-06-16
Payer: MEDICARE

## 2025-06-16 VITALS
TEMPERATURE: 98.6 F | HEIGHT: 64 IN | WEIGHT: 143 LBS | HEART RATE: 57 BPM | OXYGEN SATURATION: 97 % | RESPIRATION RATE: 18 BRPM | DIASTOLIC BLOOD PRESSURE: 78 MMHG | SYSTOLIC BLOOD PRESSURE: 134 MMHG | BODY MASS INDEX: 24.41 KG/M2

## 2025-06-16 DIAGNOSIS — Z86.79 HISTORY OF HYPERTENSION: ICD-10-CM

## 2025-06-16 DIAGNOSIS — I10 ESSENTIAL HYPERTENSION: ICD-10-CM

## 2025-06-16 DIAGNOSIS — Z95.1 PRESENCE OF AORTOCORONARY BYPASS GRAFT: ICD-10-CM

## 2025-06-16 DIAGNOSIS — I70.90 ARTERIOSCLEROTIC VASCULAR DISEASE: Primary | ICD-10-CM

## 2025-06-16 PROCEDURE — 93010 ELECTROCARDIOGRAM REPORT: CPT | Performed by: INTERNAL MEDICINE

## 2025-06-16 PROCEDURE — 3078F DIAST BP <80 MM HG: CPT | Performed by: INTERNAL MEDICINE

## 2025-06-16 PROCEDURE — 1126F AMNT PAIN NOTED NONE PRSNT: CPT | Performed by: INTERNAL MEDICINE

## 2025-06-16 PROCEDURE — 1036F TOBACCO NON-USER: CPT | Performed by: INTERNAL MEDICINE

## 2025-06-16 PROCEDURE — 1159F MED LIST DOCD IN RCRD: CPT | Performed by: INTERNAL MEDICINE

## 2025-06-16 PROCEDURE — 99214 OFFICE O/P EST MOD 30 MIN: CPT | Performed by: INTERNAL MEDICINE

## 2025-06-16 PROCEDURE — 93005 ELECTROCARDIOGRAM TRACING: CPT | Performed by: INTERNAL MEDICINE

## 2025-06-16 PROCEDURE — 3075F SYST BP GE 130 - 139MM HG: CPT | Performed by: INTERNAL MEDICINE

## 2025-06-16 PROCEDURE — 99212 OFFICE O/P EST SF 10 MIN: CPT | Performed by: INTERNAL MEDICINE

## 2025-06-16 ASSESSMENT — LIFESTYLE VARIABLES
HOW OFTEN DO YOU HAVE SIX OR MORE DRINKS ON ONE OCCASION: NEVER
HAVE YOU OR SOMEONE ELSE BEEN INJURED AS A RESULT OF YOUR DRINKING: NO
HOW OFTEN DURING THE LAST YEAR HAVE YOU FAILED TO DO WHAT WAS NORMALLY EXPECTED FROM YOU BECAUSE OF DRINKING: NEVER
AUDIT-C TOTAL SCORE: 2
AUDIT TOTAL SCORE: 2
HOW MANY STANDARD DRINKS CONTAINING ALCOHOL DO YOU HAVE ON A TYPICAL DAY: 1 OR 2
HOW OFTEN DURING THE LAST YEAR HAVE YOU NEEDED AN ALCOHOLIC DRINK FIRST THING IN THE MORNING TO GET YOURSELF GOING AFTER A NIGHT OF HEAVY DRINKING: NEVER
HOW OFTEN DURING THE LAST YEAR HAVE YOU BEEN UNABLE TO REMEMBER WHAT HAPPENED THE NIGHT BEFORE BECAUSE YOU HAD BEEN DRINKING: NEVER
HOW OFTEN DURING THE LAST YEAR HAVE YOU FOUND THAT YOU WERE NOT ABLE TO STOP DRINKING ONCE YOU HAD STARTED: NEVER
HAS A RELATIVE, FRIEND, DOCTOR, OR ANOTHER HEALTH PROFESSIONAL EXPRESSED CONCERN ABOUT YOUR DRINKING OR SUGGESTED YOU CUT DOWN: NO
SKIP TO QUESTIONS 9-10: 1
HOW OFTEN DURING THE LAST YEAR HAVE YOU HAD A FEELING OF GUILT OR REMORSE AFTER DRINKING: NEVER
HOW OFTEN DO YOU HAVE A DRINK CONTAINING ALCOHOL: 2-4 TIMES A MONTH

## 2025-06-16 ASSESSMENT — ENCOUNTER SYMPTOMS
WEIGHT LOSS: 0
ORTHOPNEA: 0
WEAKNESS: 0
DYSPNEA ON EXERTION: 0
PALPITATIONS: 0
COUGH: 0
CLAUDICATION: 0
PND: 0
LOSS OF SENSATION IN FEET: 0
WHEEZING: 0
WEIGHT GAIN: 0
SYNCOPE: 0
MYALGIAS: 0
DEPRESSION: 0
OCCASIONAL FEELINGS OF UNSTEADINESS: 0
SHORTNESS OF BREATH: 0
FEVER: 0
DIAPHORESIS: 0
NEAR-SYNCOPE: 0
DIZZINESS: 0
IRREGULAR HEARTBEAT: 0

## 2025-06-16 ASSESSMENT — PAIN SCALES - GENERAL: PAINLEVEL_OUTOF10: 0-NO PAIN

## 2025-06-16 NOTE — ASSESSMENT & PLAN NOTE
Controlled. Stopping Metoprolol as it might be contributing to ED. Advised to monitor BP at home 2-3x/week. He thinks it might be the losartan, we can try to switch him back to an ACE-I if stopping BB does not work

## 2025-06-16 NOTE — ASSESSMENT & PLAN NOTE
Stable without angina. Lipids from April reviewed, LDL is at goal. Lifestyle modifications were discussed. I advocated for mediterranean and plant based eating. We also discussed exercise. 150 minutes a week of moderate intensity exercise is recommended per our ACC/AHA guidelines

## 2025-06-16 NOTE — PROGRESS NOTES
Subjective      Chief Complaint   Patient presents with    Follow-up     Anatoly Rice is a former patient of Dr. Rodrigues's who presents to the office today for follow up.         74-year-old male with history of atherosclerotic heart disease, remote bypass surgery in 2011 presents for cardiac follow-up he also has a history of hypertension, hyperlipidemia.  He is a former patient of Dr. Rodrigues.  He was last seen 6 months ago during rather well from a cardiac standpoint. He is active, hikes and volunteers at the [facility]. He has no limitations           Review of Systems   Constitutional: Negative for diaphoresis, fever, weight gain and weight loss.   Eyes:  Negative for visual disturbance.   Cardiovascular:  Negative for chest pain, claudication, dyspnea on exertion, irregular heartbeat, leg swelling, near-syncope, orthopnea, palpitations, paroxysmal nocturnal dyspnea and syncope.   Respiratory:  Negative for cough, shortness of breath and wheezing.    Musculoskeletal:  Negative for muscle weakness and myalgias.   Neurological:  Negative for dizziness and weakness.   All other systems reviewed and are negative.       Medical History[1]     Surgical History[2]     Social History     Socioeconomic History    Marital status:      Spouse name: Not on file    Number of children: Not on file    Years of education: Not on file    Highest education level: Not on file   Occupational History    Not on file   Tobacco Use    Smoking status: Never     Passive exposure: Never    Smokeless tobacco: Never   Vaping Use    Vaping status: Never Used   Substance and Sexual Activity    Alcohol use: Yes     Alcohol/week: 2.0 standard drinks of alcohol     Types: 2 Glasses of wine per week    Drug use: Never    Sexual activity: Yes     Partners: Female     Birth control/protection: Coitus interruptus   Other Topics Concern    Not on file   Social History Narrative    Not on file     Social Drivers of Health     Financial Resource  Strain: Not on file   Food Insecurity: Not on file   Transportation Needs: Not on file   Physical Activity: Not on file   Stress: Not on file   Social Connections: Not on file   Intimate Partner Violence: Not on file   Housing Stability: Not on file        Family History[3]     OBJECTIVE:    Vitals:    06/16/25 1031   BP: 134/78   Pulse: 57   Resp: 18   Temp: 37 °C (98.6 °F)   SpO2: 97%        Vitals reviewed.   Constitutional:       Appearance: Normal and healthy appearance. Not in distress.   Pulmonary:      Effort: Pulmonary effort is normal.      Breath sounds: Normal breath sounds.   Cardiovascular:      Normal rate. Regular rhythm. Normal S1. Normal S2.       Murmurs: There is no murmur.      No gallop.  No click.   Pulses:     Intact distal pulses.   Edema:     Peripheral edema absent.   Skin:     General: Skin is warm and dry.   Neurological:      General: No focal deficit present.          Lab Review:   Lab Results   Component Value Date     04/03/2025    K 4.4 04/03/2025     04/03/2025    CO2 26 04/03/2025    BUN 18 04/03/2025    CREATININE 1.19 04/03/2025    GLUCOSE 92 04/03/2025    CALCIUM 9.0 04/03/2025     Lab Results   Component Value Date    CHOL 140 04/03/2025    TRIG 85 04/03/2025    HDL 48 04/03/2025       Lab Results   Component Value Date    LDLCALC 75 04/03/2025        Essential hypertension  Controlled. Stopping Metoprolol as it might be contributing to ED. Advised to monitor BP at home 2-3x/week. He thinks it might be the losartan, we can try to switch him back to an ACE-I if stopping BB does not work    Presence of aortocoronary bypass graft  Stable without angina. Lipids from April reviewed, LDL is at goal. Lifestyle modifications were discussed. I advocated for mediterranean and plant based eating. We also discussed exercise. 150 minutes a week of moderate intensity exercise is recommended per our ACC/AHA guidelines      History of hypertension  Controlled. Stopping Metoprolol  due to possible side effects. Will monitor            [1]   Past Medical History:  Diagnosis Date    Atherosclerotic heart disease of native coronary artery with unstable angina pectoris     Coronary artery disease with unstable angina pectoris, unspecified vessel or lesion type, unspecified whether native or transplanted heart    Benign prostatic hyperplasia     Chronic rhinitis     Rhinitis    Encounter for immunization 12/23/2013    Need for prophylactic vaccination and inoculation against influenza    Encounter for immunization 12/23/2013    Need for pneumococcal vaccination    Erectile dysfunction     Heart disease     Hypertension     Personal history of diseases of the skin and subcutaneous tissue     History of atopic dermatitis    Personal history of other diseases of male genital organs     History of benign prostatic hypertrophy    Personal history of other diseases of the circulatory system     History of hypertension    Personal history of other diseases of the circulatory system     History of angina pectoris    Personal history of other diseases of the digestive system     History of hemorrhoids    Personal history of other diseases of the respiratory system     History of upper respiratory infection    Unspecified atherosclerosis     Atherosclerosis    Urinary incontinence    [2]   Past Surgical History:  Procedure Laterality Date    COLONOSCOPY  11/21/2012    Complete Colonoscopy    CORONARY ARTERY BYPASS GRAFT  11/21/2012    CABG    CORONARY STENT PLACEMENT  2007    HERNIA REPAIR      LASER OF PROSTATE W/ GREEN LIGHT PVP  10/2022    OTHER SURGICAL HISTORY  04/13/2021    Hernia repair    PROSTATE SURGERY      TONSILLECTOMY  11/21/2012    Tonsillectomy With Adenoidectomy   [3]   Family History  Problem Relation Name Age of Onset    Breast cancer Mother Adele     Heart disease Mother Adele     Hypertension Mother Adele     Stroke Mother Adele     Cancer Mother Adele

## 2025-06-24 DIAGNOSIS — E78.00 HYPERCHOLESTEROLEMIA: ICD-10-CM

## 2025-06-24 RX ORDER — ROSUVASTATIN CALCIUM 40 MG/1
40 TABLET, COATED ORAL DAILY
Qty: 90 TABLET | Refills: 3 | Status: SHIPPED | OUTPATIENT
Start: 2025-06-24

## 2025-10-20 ENCOUNTER — APPOINTMENT (OUTPATIENT)
Dept: UROLOGY | Facility: CLINIC | Age: 75
End: 2025-10-20
Payer: MEDICARE

## 2025-10-29 ENCOUNTER — APPOINTMENT (OUTPATIENT)
Dept: PRIMARY CARE | Facility: CLINIC | Age: 75
End: 2025-10-29
Payer: MEDICARE